# Patient Record
Sex: FEMALE | Race: ASIAN | Employment: STUDENT | URBAN - METROPOLITAN AREA
[De-identification: names, ages, dates, MRNs, and addresses within clinical notes are randomized per-mention and may not be internally consistent; named-entity substitution may affect disease eponyms.]

---
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
## 2019-01-03 ENCOUNTER — OFFICE VISIT (OUTPATIENT)
Dept: RHEUMATOLOGY | Facility: CLINIC | Age: 19
End: 2019-01-03
Attending: PEDIATRICS
Payer: COMMERCIAL

## 2019-01-03 VITALS
HEART RATE: 95 BPM | BODY MASS INDEX: 22.35 KG/M2 | HEIGHT: 62 IN | WEIGHT: 121.47 LBS | DIASTOLIC BLOOD PRESSURE: 66 MMHG | SYSTOLIC BLOOD PRESSURE: 108 MMHG | TEMPERATURE: 98.1 F

## 2019-01-03 DIAGNOSIS — L40.54 JIA (JUVENILE IDIOPATHIC ARTHRITIS), PSORIATIC SUBTYPE (H): ICD-10-CM

## 2019-01-03 DIAGNOSIS — Z79.1 NSAID LONG-TERM USE: ICD-10-CM

## 2019-01-03 DIAGNOSIS — Z79.631 METHOTREXATE, LONG TERM, CURRENT USE: ICD-10-CM

## 2019-01-03 DIAGNOSIS — L40.9 PSORIASIS: ICD-10-CM

## 2019-01-03 DIAGNOSIS — L40.54 JIA (JUVENILE IDIOPATHIC ARTHRITIS), PSORIATIC SUBTYPE (H): Primary | ICD-10-CM

## 2019-01-03 DIAGNOSIS — Z97.3 WEARS GLASSES: ICD-10-CM

## 2019-01-03 LAB
ALBUMIN SERPL-MCNC: 4 G/DL (ref 3.4–5)
ALBUMIN UR-MCNC: NEGATIVE MG/DL
ALP SERPL-CCNC: 46 U/L (ref 40–150)
ALT SERPL W P-5'-P-CCNC: 12 U/L (ref 0–50)
APPEARANCE UR: CLEAR
AST SERPL W P-5'-P-CCNC: 18 U/L (ref 0–35)
BACTERIA #/AREA URNS HPF: ABNORMAL /HPF
BASOPHILS # BLD AUTO: 0 10E9/L (ref 0–0.2)
BASOPHILS NFR BLD AUTO: 0.3 %
BILIRUB DIRECT SERPL-MCNC: 0.1 MG/DL (ref 0–0.2)
BILIRUB SERPL-MCNC: 0.4 MG/DL (ref 0.2–1.3)
BILIRUB UR QL STRIP: NEGATIVE
COLOR UR AUTO: YELLOW
DIFFERENTIAL METHOD BLD: NORMAL
EOSINOPHIL # BLD AUTO: 0.3 10E9/L (ref 0–0.7)
EOSINOPHIL NFR BLD AUTO: 4.7 %
ERYTHROCYTE [DISTWIDTH] IN BLOOD BY AUTOMATED COUNT: 12 % (ref 10–15)
GLUCOSE UR STRIP-MCNC: NEGATIVE MG/DL
HCT VFR BLD AUTO: 41.2 % (ref 35–47)
HGB BLD-MCNC: 13.5 G/DL (ref 11.7–15.7)
HGB UR QL STRIP: NEGATIVE
IMM GRANULOCYTES # BLD: 0 10E9/L (ref 0–0.4)
IMM GRANULOCYTES NFR BLD: 0 %
KETONES UR STRIP-MCNC: NEGATIVE MG/DL
LEUKOCYTE ESTERASE UR QL STRIP: NEGATIVE
LYMPHOCYTES # BLD AUTO: 2.4 10E9/L (ref 0.8–5.3)
LYMPHOCYTES NFR BLD AUTO: 38.5 %
MCH RBC QN AUTO: 28.3 PG (ref 26.5–33)
MCHC RBC AUTO-ENTMCNC: 32.8 G/DL (ref 31.5–36.5)
MCV RBC AUTO: 86 FL (ref 78–100)
MONOCYTES # BLD AUTO: 0.4 10E9/L (ref 0–1.3)
MONOCYTES NFR BLD AUTO: 6.6 %
MUCOUS THREADS #/AREA URNS LPF: PRESENT /LPF
NEUTROPHILS # BLD AUTO: 3.2 10E9/L (ref 1.6–8.3)
NEUTROPHILS NFR BLD AUTO: 49.9 %
NITRATE UR QL: NEGATIVE
NRBC # BLD AUTO: 0 10*3/UL
NRBC BLD AUTO-RTO: 0 /100
PH UR STRIP: 5.5 PH (ref 5–7)
PLATELET # BLD AUTO: 268 10E9/L (ref 150–450)
PROT SERPL-MCNC: 7.4 G/DL (ref 6.8–8.8)
RBC # BLD AUTO: 4.77 10E12/L (ref 3.8–5.2)
RBC #/AREA URNS AUTO: 1 /HPF (ref 0–2)
SOURCE: ABNORMAL
SP GR UR STRIP: 1.02 (ref 1–1.03)
SQUAMOUS #/AREA URNS AUTO: 2 /HPF (ref 0–1)
T4 FREE SERPL-MCNC: 0.97 NG/DL (ref 0.76–1.46)
TSH SERPL DL<=0.005 MIU/L-ACNC: 1.19 MU/L (ref 0.4–4)
UROBILINOGEN UR STRIP-MCNC: NORMAL MG/DL (ref 0–2)
WBC # BLD AUTO: 6.3 10E9/L (ref 4–11)
WBC #/AREA URNS AUTO: 3 /HPF (ref 0–5)

## 2019-01-03 PROCEDURE — 84439 ASSAY OF FREE THYROXINE: CPT | Performed by: PEDIATRICS

## 2019-01-03 PROCEDURE — 36415 COLL VENOUS BLD VENIPUNCTURE: CPT | Performed by: PEDIATRICS

## 2019-01-03 PROCEDURE — G0463 HOSPITAL OUTPT CLINIC VISIT: HCPCS

## 2019-01-03 PROCEDURE — 80076 HEPATIC FUNCTION PANEL: CPT | Performed by: PEDIATRICS

## 2019-01-03 PROCEDURE — 90686 IIV4 VACC NO PRSV 0.5 ML IM: CPT | Mod: ZF

## 2019-01-03 PROCEDURE — 25000128 H RX IP 250 OP 636: Mod: ZF

## 2019-01-03 PROCEDURE — 85025 COMPLETE CBC W/AUTO DIFF WBC: CPT | Performed by: PEDIATRICS

## 2019-01-03 PROCEDURE — G0008 ADMIN INFLUENZA VIRUS VAC: HCPCS | Mod: ZF

## 2019-01-03 PROCEDURE — 84443 ASSAY THYROID STIM HORMONE: CPT | Performed by: PEDIATRICS

## 2019-01-03 PROCEDURE — 81001 URINALYSIS AUTO W/SCOPE: CPT | Performed by: PEDIATRICS

## 2019-01-03 RX ORDER — MELOXICAM 7.5 MG/1
TABLET ORAL
Qty: 135 TABLET | Refills: 1 | Status: SHIPPED | OUTPATIENT
Start: 2019-01-03 | End: 2020-03-18

## 2019-01-03 ASSESSMENT — MIFFLIN-ST. JEOR: SCORE: 1280

## 2019-01-03 ASSESSMENT — PAIN SCALES - GENERAL: PAINLEVEL: NO PAIN (0)

## 2019-01-03 NOTE — LETTER
1/3/2019      RE: Deedee ESCOBEDO Suiter  [address]  [place] [address]              Problem list:     Patient Active Problem List    Diagnosis Date Noted     Wears glasses 01/06/2017     Methotrexate, long term, current use 12/28/2015     For ANAY.  Is NOT particularly immunosuppressant.         ANAY (juvenile idiopathic arthritis), psoriatic subtype 06/10/2015     First peds rheum consult 6/10/2015: right elbow arthritis, left knee arthritis, finger/toe stiffness, enthesitis, SI tenderness. Also had Strep (RST positive; treated, decreased ASO/AntiDNase B). Started on meloxicam. Improved exam on 7/30/2015.  Started SQ methotrexate 11/12/2015 due to ?SI arthritis on exam and finger arthritis.  MRI SI/Coccyx without contrast done at [facility] on 11/30/2015: Normal SI joints, mild edematous marrow signal changes of the sacrococcygeal joint and a mobile distal intercoccygeal joint suggestive of ongoing inflammation.  12/28/2015 ~ CROM. 12/19/2016 when not really on meloxicam, had some flare of inflammation.  3/30/2017 on more meloxicam, all normal but 2 fingers stiff       Psoriasis 04/26/2015     Guttate and scalp psoriasis.  Dx 2-3/2015.                   Medications:     As of completion of this visit:  Current Outpatient Medications   Medication Sig Dispense Refill     meloxicam (MOBIC) 7.5 MG tablet Take 1.5 tabs by mouth daily with food. 135 tablet 1     methotrexate 2.5 MG tablet CHEMO Take 6 tablets (15 mg) by mouth once a week 72 tablet 1     fluocinonide (LIDEX) 0.05 % ointment Apply topically 2 times daily Use twice daily to psoriasis areas on the arms, legs, body until spots are gone. (Patient not taking: Reported on 6/18/2018) 120 g 6     ondansetron (ZOFRAN-ODT) 4 MG ODT tab Take 1-2 tablets (4-8 mg) by mouth every 8 hours as needed for nausea Dissolve ON the tongue. (Patient not taking: Reported on 6/18/2018) 14 tablet 0             Subjective:     I saw Deedee in Pediatric Rheumatology Clinic on 01/03/2019 in  followup for psoriatic juvenile idiopathic arthritis.  Other diagnoses pertinent to today's visit include guttate and scalp psoriasis, generalized joint hypermobility and long-term use of meloxicam and methotrexate.  I last saw Deedee 6-1/2 months ago on 06/18/2018 when she had stiffness of her bilateral third and fourth fingers with on and off swelling and was taking her meloxicam about half the days of the week and her methotrexate fairly routinely.  I recommended tightening up her medications and rechecking.      Since last visit, Deedee tells me her arthritis was just fine until she forgot to take her medications home from school.  Thus, she has not taken her methotrexate since before 12/14/2018 (or about 3 weeks) and she has been off meloxicam or NSAID with the exception of 1 week post her wisdom teeth extraction when she took ibuprofen 600 mg 3-4 times per day.  She subsequently has been off that for about 1-1/2 weeks.  She can tell she has some stiffness and joint symptoms starting to pop through a little bit with this.      Her psoriasis is much better.  She has no spots on her scalp and she only has 1 very small spot on her left upper arm.  She is not using any topical therapies.      Prior to forgetting her medications at school, she was quite consistent with her meloxicam, taking it most of the days of the week, and much more consistent with methotrexate, taking it essentially every time it was due.      She has otherwise been well since I last saw her.  Her last eye exam was in 08/2018.  There were no concerns.      From a past medical history and surgical history standpoint, she had her wisdom teeth out on 12/17/2018.  She has had no complications to date.      From a social history standpoint, she continues at [employer] and she is in her sophomore year.  This is her hardest semester yet and she got 4 A's and 3 B's.  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  When she is  "home, she works at [employer].  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  This upcoming summer, she is hoping to work at a  clinic at least part of the time.      From a family history standpoint, there is no update as she was adopted.      Deedee has not yet gotten an influenza vaccination this season.     Comprehensive Review of Systems was performed and is negative except as noted in the HPI.    Information per our standardized questionnaire is as below:  Last Exam: 6/18/2018  (COIN) Last Eye Exam: 08/15/18  Last Radiograph : 10/26/17  Self Report  (COIN) Patient Pain Status: 2  (COIN) Patient Global Assessment Of Disease Activity: 0.5  Score Reported By: Self  (COIN) Patient Highest Level Of Education: some college or technical school  Arthritis History  (COIN) Morning stiffness in the past week: no stiffness  Has your arthritis stopped from trying any athletic or rigorous activities, or interfaced with your ability to do these activities: No  Have you been limited your ability to do normal daily activities in the past week: No  Did you needed help from other people to do normal activities in the past week: No  Have you used any aids or devices to help you do normal daily activities in the past week: No  Important Medical Events  (COIN) Patient has experienced drug-related serious adverse events since last encounter?: No           Examination:     Blood pressure 108/66, pulse 95, temperature 98.1  F (36.7  C), height 1.576 m (5' 2.05\"), weight 55.1 kg (121 lb 7.6 oz), not currently breastfeeding. Blood pressure percentiles are 37 % systolic and 54 % diastolic based on the August 2017 AAP Clinical Practice Guideline.    GEN:  Alert, awake and well-appearing.  HEENT:  Hair and scalp within normal limits.  Wears glasses.  Pupils equal and reactive to light.  Extraocular movements intact.  Conjunctiva clear.  External pinnae and tympanic membranes normal bilaterally. Nasal mucosa " normal without lesions.  Oral mucosa moist and without lesions. ?mild thyromegaly, non-tender, no nodules palpated.  LYMPH:  No cervical or supraclavicular lymphadenopathy.  CV:  Regular rate and rhythm.  No murmurs, rubs or gallops.  Radial and dorsalis pedal pulses full and symmetric.  RESP:  Clear to auscultation bilaterally with good aeration.   ABD:  Soft, non-tender, non-distended.  No hepatosplenomegaly or masses appreciated.  SKIN: A full skin exam is performed, except for the breast, genital and buttocks area, and is normal other than one small patch of scaly skin on left upper arm consistent with guttate psoriasis; dry skin.  Nails are normal.  Nail pits on left 3rd and right 4th nails.  NEURO:  Awake, alert and oriented.  Face symmetric.  MUSCULOSKELETAL: Joint exam including TMJ, cervical spine, acromioclavicular, sternoclavicular, shoulders, elbows, wrists, fingers, hips, knees, ankles, toes was performed and is normal except for pain with abduction of TMJ (1.5 s/p wisdom tooth extraction), stiffness of the right 3rd fingernail, inferior patellar pole tenderness to palpation on left, ???scant left knee effusion.  Left great toe MTP is tender to palpation on the plantar aspect.  Has baseline joint hypermobility of elbows, hips, knees, pes planus.   Back is flexible.  Strength is 5/5 in upper and lower extremities.   ANAY Exam Details:    Axial Skeleton  Temporomandibular: (ID low at 2.5 cm, 1.5 weeks after wisdom teeth extraction)  (COIN) Sacroiliac tenderness:: No  (COIN) Positive ESPERANZA test:: No  (COIN) Modified Schober's Test:: No  (COIN) Schober Test (Cm): (flexible, can palm floor)    Upper Extremity  Middle PIP: R Loss of Motion(Stiff flexion of right 3rd PIP)    Lower Extremity  Great MTP: L Tender    Entheses  Patella Insertions : R Tender  (COIN) Tender Entheses count: 1         Last Imaging Results:     10/26/2017 x-rays of the bilateral hands and knees: normal.          Last Lab Results:    Laboratory investigations performed today are listed below.    Office Visit on 01/03/2019   Component Date Value Ref Range Status     WBC 01/03/2019 6.3  4.0 - 11.0 10e9/L Final     RBC Count 01/03/2019 4.77  3.8 - 5.2 10e12/L Final     Hemoglobin 01/03/2019 13.5  11.7 - 15.7 g/dL Final     Hematocrit 01/03/2019 41.2  35.0 - 47.0 % Final     MCV 01/03/2019 86  78 - 100 fl Final     MCH 01/03/2019 28.3  26.5 - 33.0 pg Final     MCHC 01/03/2019 32.8  31.5 - 36.5 g/dL Final     RDW 01/03/2019 12.0  10.0 - 15.0 % Final     Platelet Count 01/03/2019 268  150 - 450 10e9/L Final     Diff Method 01/03/2019 Automated Method   Final     % Neutrophils 01/03/2019 49.9  % Final     % Lymphocytes 01/03/2019 38.5  % Final     % Monocytes 01/03/2019 6.6  % Final     % Eosinophils 01/03/2019 4.7  % Final     % Basophils 01/03/2019 0.3  % Final     % Immature Granulocytes 01/03/2019 0.0  % Final     Nucleated RBCs 01/03/2019 0  0 /100 Final     Absolute Neutrophil 01/03/2019 3.2  1.6 - 8.3 10e9/L Final     Absolute Lymphocytes 01/03/2019 2.4  0.8 - 5.3 10e9/L Final     Absolute Monocytes 01/03/2019 0.4  0.0 - 1.3 10e9/L Final     Absolute Eosinophils 01/03/2019 0.3  0.0 - 0.7 10e9/L Final     Absolute Basophils 01/03/2019 0.0  0.0 - 0.2 10e9/L Final     Abs Immature Granulocytes 01/03/2019 0.0  0 - 0.4 10e9/L Final     Absolute Nucleated RBC 01/03/2019 0.0   Final     Bilirubin Direct 01/03/2019 0.1  0.0 - 0.2 mg/dL Final     Bilirubin Total 01/03/2019 0.4  0.2 - 1.3 mg/dL Final     Albumin 01/03/2019 4.0  3.4 - 5.0 g/dL Final     Protein Total 01/03/2019 7.4  6.8 - 8.8 g/dL Final     Alkaline Phosphatase 01/03/2019 46  40 - 150 U/L Final     ALT 01/03/2019 12  0 - 50 U/L Final     AST 01/03/2019 18  0 - 35 U/L Final     TSH 01/03/2019 1.19  0.40 - 4.00 mU/L Final     T4 Free 01/03/2019 0.97  0.76 - 1.46 ng/dL Final     Color Urine 01/03/2019 Yellow   Final     Appearance Urine 01/03/2019 Clear   Final     Glucose Urine 01/03/2019  Negative  NEG^Negative mg/dL Final     Bilirubin Urine 01/03/2019 Negative  NEG^Negative Final     Ketones Urine 01/03/2019 Negative  NEG^Negative mg/dL Final     Specific Gravity Urine 01/03/2019 1.019  1.003 - 1.035 Final     Blood Urine 01/03/2019 Negative  NEG^Negative Final     pH Urine 01/03/2019 5.5  5.0 - 7.0 pH Final     Protein Albumin Urine 01/03/2019 Negative  NEG^Negative mg/dL Final     Urobilinogen mg/dL 01/03/2019 Normal  0.0 - 2.0 mg/dL Final     Nitrite Urine 01/03/2019 Negative  NEG^Negative Final     Leukocyte Esterase Urine 01/03/2019 Negative  NEG^Negative Final     Source 01/03/2019 Midstream Urine   Final     WBC Urine 01/03/2019 3  0 - 5 /HPF Final     RBC Urine 01/03/2019 1  0 - 2 /HPF Final     Bacteria Urine 01/03/2019 Few* NEG^Negative /HPF Final     Squamous Epithelial /HPF Urine 01/03/2019 2* 0 - 1 /HPF Final     Mucous Urine 01/03/2019 Present* NEG^Negative /LPF Final                Assessment:     Deedee is a 18-year 2-month-old female with:   1.  Psoriatic arthritis, interval history is reassuring and exam is essentially normal with the exception of some enthesitis and third finger stiffness despite essentially being off medications for the last 3 weeks as she forgot them at school.  She also has pain and decreased abduction of her TMJ 1-1/2 weeks status post wisdom teeth extraction.   2.  New mild anemia at last medication monitoring labs in September, repeat pending today.      At this point, I recommended that Deedee get back on her scheduled medications when she is able to but that she could use ibuprofen 3 times a day in the meantime until she is back to school in about a week and a half from now.              Plan:     1.  Laboratory studies today as above.  I did a TSH and free T4 due to her mild thyromegaly.   2.  No imaging today.   3.  Continue current medications once back at school (meloxicam and methotrexate).  Until back at school, should use ibuprofen 600 mg by mouth 3  times daily.   4.  Flu shot today.   5.  Next medication monitoring labs will be due in March, and she will be home in mid March for break.  I will put orders in Epic for these to be done and should include a CBC with differential, platelets and hepatic panel.   6.  Continue yearly eye exam screening for uveitis, next is due in approximately 08/2019.     7.  Followup with me in 6 months.  Call sooner with concerns.  At next visit, we will talk about planning for transition as next year will be her last year of college.       Thank you for continuing to involve me in Deedee's medical care.  Please do not hesitate to contact me with any questions or concerns.    Sincerely,    Hoda Dukes M.D.   of Pediatrics  Pediatric Rheumatology  Direct clinic number [phone]  Pager : [phone]    CC  Patient Care Team:  Addie Cochran MD as PCP - General (Pediatrics)  Lori Ewing MD as MD (Dermatology)    Copy to patient    Parent(s) of Deedee Suiter  [address] [facility] [address]

## 2019-01-03 NOTE — NURSING NOTE
"Chief Complaint   Patient presents with     RECHECK     ANAY     /66   Pulse 95   Temp 98.1  F (36.7  C)   Ht 5' 2.05\" (157.6 cm)   Wt 121 lb 7.6 oz (55.1 kg)   BMI 22.18 kg/m    Bianca Miranda CMA    "

## 2019-01-03 NOTE — PROGRESS NOTES
Problem list:     Patient Active Problem List    Diagnosis Date Noted     Wears glasses 01/06/2017     Methotrexate, long term, current use 12/28/2015     For ANAY.  Is NOT particularly immunosuppressant.         ANAY (juvenile idiopathic arthritis), psoriatic subtype 06/10/2015     First peds rheum consult 6/10/2015: right elbow arthritis, left knee arthritis, finger/toe stiffness, enthesitis, SI tenderness. Also had Strep (RST positive; treated, decreased ASO/AntiDNase B). Started on meloxicam. Improved exam on 7/30/2015.  Started SQ methotrexate 11/12/2015 due to ?SI arthritis on exam and finger arthritis.  MRI SI/Coccyx without contrast done at [facility] on 11/30/2015: Normal SI joints, mild edematous marrow signal changes of the sacrococcygeal joint and a mobile distal intercoccygeal joint suggestive of ongoing inflammation.  12/28/2015 ~ CROM. 12/19/2016 when not really on meloxicam, had some flare of inflammation.  3/30/2017 on more meloxicam, all normal but 2 fingers stiff       Psoriasis 04/26/2015     Guttate and scalp psoriasis.  Dx 2-3/2015.                   Medications:     As of completion of this visit:  Current Outpatient Medications   Medication Sig Dispense Refill     meloxicam (MOBIC) 7.5 MG tablet Take 1.5 tabs by mouth daily with food. 135 tablet 1     methotrexate 2.5 MG tablet CHEMO Take 6 tablets (15 mg) by mouth once a week 72 tablet 1     fluocinonide (LIDEX) 0.05 % ointment Apply topically 2 times daily Use twice daily to psoriasis areas on the arms, legs, body until spots are gone. (Patient not taking: Reported on 6/18/2018) 120 g 6     ondansetron (ZOFRAN-ODT) 4 MG ODT tab Take 1-2 tablets (4-8 mg) by mouth every 8 hours as needed for nausea Dissolve ON the tongue. (Patient not taking: Reported on 6/18/2018) 14 tablet 0             Subjective:     I saw Deedee in [facility]umatology Clinic on 01/03/2019 in followup for psoriatic juvenile idiopathic arthritis.  Other diagnoses pertinent  to today's visit include guttate and scalp psoriasis, generalized joint hypermobility and long-term use of meloxicam and methotrexate.  I last saw Deedee 6-1/2 months ago on 06/18/2018 when she had stiffness of her bilateral third and fourth fingers with on and off swelling and was taking her meloxicam about half the days of the week and her methotrexate fairly routinely.  I recommended tightening up her medications and rechecking.      Since last visit, Deedee tells me her arthritis was just fine until she forgot to take her medications home from school.  Thus, she has not taken her methotrexate since before 12/14/2018 (or about 3 weeks) and she has been off meloxicam or NSAID with the exception of 1 week post her wisdom teeth extraction when she took ibuprofen 600 mg 3-4 times per day.  She subsequently has been off that for about 1-1/2 weeks.  She can tell she has some stiffness and joint symptoms starting to pop through a little bit with this.      Her psoriasis is much better.  She has no spots on her scalp and she only has 1 very small spot on her left upper arm.  She is not using any topical therapies.      Prior to forgetting her medications at school, she was quite consistent with her meloxicam, taking it most of the days of the week, and much more consistent with methotrexate, taking it essentially every time it was due.      She has otherwise been well since I last saw her.  Her last eye exam was in 08/2018.  There were no concerns.      From a past medical history and surgical history standpoint, she had her wisdom teeth out on 12/17/2018.  She has had no complications to date.      From a social history standpoint, she continues at [employer] and she is in her sophomore year.  This is her hardest semester yet and she got 4 A's and 3 B's.  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  When she is home, she works at [employer].  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  This upcoming summer, she is hoping to work at a  clinic at least part of the time.      From a family history standpoint, there is no update as she was adopted.      Deedee has not yet gotten an influenza vaccination this season.     Comprehensive Review of Systems was performed and is negative except as noted in the HPI.    Information per our standardized questionnaire is as below:  Last Exam: 6/18/2018  (COIN) Last Eye Exam: 08/15/18  Last Radiograph : 10/26/17  Self Report  (COIN) Patient Pain Status: 2  (COIN) Patient Global Assessment Of Disease Activity: 0.5  Score Reported By: Self  (COIN) Patient Highest Level Of Education: some college or technical school  Arthritis History  (COIN) Morning stiffness in the past week: no stiffness  Has your arthritis stopped from trying any athletic or rigorous activities, or interfaced with your ability to do these activities: No  Have you been limited your ability to do normal daily activities in the past week: No  Did you needed help from other people to do normal activities in the past week: No  Have you used any aids or devices to help you do normal daily activities in the past week: No  Important Medical Events  (COIN) Patient has experienced drug-related serious adverse events since last encounter?: No           Examination:     Blood pressure 108/66, pulse 95, temperature 98.1  F (36.7  C), height 1.576 m (5' 2.05\"), weight 55.1 kg (121 lb 7.6 oz), not currently breastfeeding. Blood pressure percentiles are 37 % systolic and 54 % diastolic based on the August 2017 AAP Clinical Practice Guideline.    GEN:  Alert, awake and well-appearing.  HEENT:  Hair and scalp within normal limits.  Wears glasses.  Pupils equal and reactive to light.  Extraocular movements intact.  Conjunctiva clear.  External pinnae and tympanic membranes normal bilaterally. Nasal mucosa normal without lesions.  Oral mucosa moist and without lesions. ?mild thyromegaly, " non-tender, no nodules palpated.  LYMPH:  No cervical or supraclavicular lymphadenopathy.  CV:  Regular rate and rhythm.  No murmurs, rubs or gallops.  Radial and dorsalis pedal pulses full and symmetric.  RESP:  Clear to auscultation bilaterally with good aeration.   ABD:  Soft, non-tender, non-distended.  No hepatosplenomegaly or masses appreciated.  SKIN: A full skin exam is performed, except for the breast, genital and buttocks area, and is normal other than one small patch of scaly skin on left upper arm consistent with guttate psoriasis; dry skin.  Nails are normal.  Nail pits on left 3rd and right 4th nails.  NEURO:  Awake, alert and oriented.  Face symmetric.  MUSCULOSKELETAL: Joint exam including TMJ, cervical spine, acromioclavicular, sternoclavicular, shoulders, elbows, wrists, fingers, hips, knees, ankles, toes was performed and is normal except for pain with abduction of TMJ (1.5 s/p wisdom tooth extraction), stiffness of the right 3rd fingernail, inferior patellar pole tenderness to palpation on left, ???scant left knee effusion.  Left great toe MTP is tender to palpation on the plantar aspect.  Has baseline joint hypermobility of elbows, hips, knees, pes planus.   Back is flexible.  Strength is 5/5 in upper and lower extremities.   ANAY Exam Details:    Axial Skeleton  Temporomandibular: (ID low at 2.5 cm, 1.5 weeks after wisdom teeth extraction)  (COIN) Sacroiliac tenderness:: No  (COIN) Positive ESPERANZA test:: No  (COIN) Modified Schober's Test:: No  (COIN) Schober Test (Cm): (flexible, can palm floor)    Upper Extremity  Middle PIP: R Loss of Motion(Stiff flexion of right 3rd PIP)    Lower Extremity  Great MTP: L Tender    Entheses  Patella Insertions : R Tender  (COIN) Tender Entheses count: 1         Last Imaging Results:     10/26/2017 x-rays of the bilateral hands and knees: normal.          Last Lab Results:   Laboratory investigations performed today are listed below.    Office Visit on  01/03/2019   Component Date Value Ref Range Status     WBC 01/03/2019 6.3  4.0 - 11.0 10e9/L Final     RBC Count 01/03/2019 4.77  3.8 - 5.2 10e12/L Final     Hemoglobin 01/03/2019 13.5  11.7 - 15.7 g/dL Final     Hematocrit 01/03/2019 41.2  35.0 - 47.0 % Final     MCV 01/03/2019 86  78 - 100 fl Final     MCH 01/03/2019 28.3  26.5 - 33.0 pg Final     MCHC 01/03/2019 32.8  31.5 - 36.5 g/dL Final     RDW 01/03/2019 12.0  10.0 - 15.0 % Final     Platelet Count 01/03/2019 268  150 - 450 10e9/L Final     Diff Method 01/03/2019 Automated Method   Final     % Neutrophils 01/03/2019 49.9  % Final     % Lymphocytes 01/03/2019 38.5  % Final     % Monocytes 01/03/2019 6.6  % Final     % Eosinophils 01/03/2019 4.7  % Final     % Basophils 01/03/2019 0.3  % Final     % Immature Granulocytes 01/03/2019 0.0  % Final     Nucleated RBCs 01/03/2019 0  0 /100 Final     Absolute Neutrophil 01/03/2019 3.2  1.6 - 8.3 10e9/L Final     Absolute Lymphocytes 01/03/2019 2.4  0.8 - 5.3 10e9/L Final     Absolute Monocytes 01/03/2019 0.4  0.0 - 1.3 10e9/L Final     Absolute Eosinophils 01/03/2019 0.3  0.0 - 0.7 10e9/L Final     Absolute Basophils 01/03/2019 0.0  0.0 - 0.2 10e9/L Final     Abs Immature Granulocytes 01/03/2019 0.0  0 - 0.4 10e9/L Final     Absolute Nucleated RBC 01/03/2019 0.0   Final     Bilirubin Direct 01/03/2019 0.1  0.0 - 0.2 mg/dL Final     Bilirubin Total 01/03/2019 0.4  0.2 - 1.3 mg/dL Final     Albumin 01/03/2019 4.0  3.4 - 5.0 g/dL Final     Protein Total 01/03/2019 7.4  6.8 - 8.8 g/dL Final     Alkaline Phosphatase 01/03/2019 46  40 - 150 U/L Final     ALT 01/03/2019 12  0 - 50 U/L Final     AST 01/03/2019 18  0 - 35 U/L Final     TSH 01/03/2019 1.19  0.40 - 4.00 mU/L Final     T4 Free 01/03/2019 0.97  0.76 - 1.46 ng/dL Final     Color Urine 01/03/2019 Yellow   Final     Appearance Urine 01/03/2019 Clear   Final     Glucose Urine 01/03/2019 Negative  NEG^Negative mg/dL Final     Bilirubin Urine 01/03/2019 Negative   NEG^Negative Final     Ketones Urine 01/03/2019 Negative  NEG^Negative mg/dL Final     Specific Gravity Urine 01/03/2019 1.019  1.003 - 1.035 Final     Blood Urine 01/03/2019 Negative  NEG^Negative Final     pH Urine 01/03/2019 5.5  5.0 - 7.0 pH Final     Protein Albumin Urine 01/03/2019 Negative  NEG^Negative mg/dL Final     Urobilinogen mg/dL 01/03/2019 Normal  0.0 - 2.0 mg/dL Final     Nitrite Urine 01/03/2019 Negative  NEG^Negative Final     Leukocyte Esterase Urine 01/03/2019 Negative  NEG^Negative Final     Source 01/03/2019 Midstream Urine   Final     WBC Urine 01/03/2019 3  0 - 5 /HPF Final     RBC Urine 01/03/2019 1  0 - 2 /HPF Final     Bacteria Urine 01/03/2019 Few* NEG^Negative /HPF Final     Squamous Epithelial /HPF Urine 01/03/2019 2* 0 - 1 /HPF Final     Mucous Urine 01/03/2019 Present* NEG^Negative /LPF Final                Assessment:     Deedee is a 18-year 2-month-old female with:   1.  Psoriatic arthritis, interval history is reassuring and exam is essentially normal with the exception of some enthesitis and third finger stiffness despite essentially being off medications for the last 3 weeks as she forgot them at school.  She also has pain and decreased abduction of her TMJ 1-1/2 weeks status post wisdom teeth extraction.   2.  New mild anemia at last medication monitoring labs in September, repeat pending today.      At this point, I recommended that Deedee get back on her scheduled medications when she is able to but that she could use ibuprofen 3 times a day in the meantime until she is back to school in about a week and a half from now.              Plan:     1.  Laboratory studies today as above.  I did a TSH and free T4 due to her mild thyromegaly.   2.  No imaging today.   3.  Continue current medications once back at school (meloxicam and methotrexate).  Until back at school, should use ibuprofen 600 mg by mouth 3 times daily.   4.  Flu shot today.   5.  Next medication monitoring labs will be  due in March, and she will be home in mid March for break.  I will put orders in Epic for these to be done and should include a CBC with differential, platelets and hepatic panel.   6.  Continue yearly eye exam screening for uveitis, next is due in approximately 08/2019.     7.  Followup with me in 6 months.  Call sooner with concerns.  At next visit, we will talk about planning for transition as next year will be her last year of college.       Thank you for continuing to involve me in Deedee's medical care.  Please do not hesitate to contact me with any questions or concerns.    Sincerely,    Hoda Dukes M.D.   of Pediatrics  Pediatric Rheumatology  Direct clinic number [phone]  Pager : [phone]    CC  Patient Care Team:  Addie Noel MD as PCP - General (Pediatrics)  Addie Noel MD as PCP - Assigned PCP  Hoda Dukes MD as MD (Pediatrics)  Addie Noel MD as MD (Pediatrics)  Lori Ewing MD as MD (Dermatology)  Hoda Dukes MD as Referring Physician (Pediatrics)  Hoda Dukes MD as MD (Pediatrics)  ADDIE NOEL    Copy to patient  SUITER,CAITLYN   [id number] [facility] [address]

## 2019-01-03 NOTE — NURSING NOTE
"FLU VACCINE QUESTIONNAIRE:  Ask the following questions of all parties who want influenza vaccination:     CONTRAINDICATIONS  1.  Is the patient age less than 6 months?  NO  2.  Has the person to be vaccinated ever had Guillain-Midland syndrome? NO  3.  Has the person to be vaccinated had the vaccine this year? NO  4.  Is the person to be vaccinated sick today? NO  5.  Does the person to be vaccinated have an allergy to eggs or a component of the vaccine? NO  6.  Has the person to vaccinated ever had a serious reaction to an influenza vaccination in the past? NO    If the answer to ALL of the above questions is \"No\", then please administer the influenza vaccine per the standard protocol.  If the patient answered \"Yes\" to questions 1 or 2, do not administer the vaccine. If the patient answered \"Yes\" to question 3, do not administer the vaccine unless the patient is a child receiving the vaccine in two doses. If the patient answered \"Yes\" to questions 4, 5, and/or 6, get additional details on sickness and/or reaction and refer to provider. If you have any questions regarding contraindications, please refer to the provider.                                                         INFLUENZA VACCINATION NOTE      Information sheet given to patient and questions answered.     Patient or representative refused vaccination.   Reason:     ORDERS: Give influenza Vaccine   Ordered by Dr. Dukes on January 3, 2019    [ Do not give Influenza Vaccine due to contraindication or refusal ]    Candidate for Pneumovax? No    INDICATION FOR VACCINATION:  Anyone from 6 months of age or older.        Bharti Portillo M.A.      "

## 2019-01-03 NOTE — PATIENT INSTRUCTIONS
Little blips off meds--  Until back home can take/should take ibuprofen 600 mg three times daily.  Restart home meds once back.  Labs today.    Next labs March/April 2019.  Do at  clinic orders are in Epic.  Follow up with me in 6 months.  Eye exam in summer.  Flu shot today.    [facility]    For Help:  The Pediatric Call Center at [phone] can help with scheduling of routine follow up visits.  Brionna Santamaria and Grace Pierson are the Nurse Coordinators for the Division of Pediatric Rheumatology and can be reached directly at [phone]. They can help with questions about your child s rheumatic condition, medications, and test results.   Please try to schedule infusions 3 months in advance.  Please try to give us 72 hours or longer notice if you need to cancel infusions so other patients can benefit from this opening).  Note: Insurance authorization must be obtained before any infusion can be scheduled. If you change health insurance, you must notify our office as soon as possible, so that the infusion can be reauthorized.    For emergencies after hours or on the weekends, please call the page  at [phone] and ask to speak to the physician on-call for Pediatric Rheumatology. Please do not use [employer] for urgent requests.  Main  Services:  [phone]  o Hmong/Yoruba/Luxembourgish: [phone]  o Albanian: [phone]  o Divehi: [phone]

## 2019-07-25 ENCOUNTER — OFFICE VISIT (OUTPATIENT)
Dept: RHEUMATOLOGY | Facility: CLINIC | Age: 19
End: 2019-07-25
Attending: PEDIATRICS
Payer: COMMERCIAL

## 2019-07-25 VITALS
HEIGHT: 62 IN | WEIGHT: 126.76 LBS | HEART RATE: 84 BPM | DIASTOLIC BLOOD PRESSURE: 78 MMHG | SYSTOLIC BLOOD PRESSURE: 107 MMHG | TEMPERATURE: 98.6 F | BODY MASS INDEX: 23.33 KG/M2

## 2019-07-25 DIAGNOSIS — Z79.631 METHOTREXATE, LONG TERM, CURRENT USE: ICD-10-CM

## 2019-07-25 DIAGNOSIS — Z79.1 NSAID LONG-TERM USE: ICD-10-CM

## 2019-07-25 DIAGNOSIS — L40.54 JIA (JUVENILE IDIOPATHIC ARTHRITIS), PSORIATIC SUBTYPE (H): Primary | ICD-10-CM

## 2019-07-25 DIAGNOSIS — L40.9 PSORIASIS: ICD-10-CM

## 2019-07-25 LAB
ALBUMIN SERPL-MCNC: 4 G/DL (ref 3.4–5)
ALBUMIN UR-MCNC: NEGATIVE MG/DL
ALP SERPL-CCNC: 48 U/L (ref 40–150)
ALT SERPL W P-5'-P-CCNC: 31 U/L (ref 0–50)
APPEARANCE UR: CLEAR
AST SERPL W P-5'-P-CCNC: 19 U/L (ref 0–35)
BASOPHILS # BLD AUTO: 0 10E9/L (ref 0–0.2)
BASOPHILS NFR BLD AUTO: 0.5 %
BILIRUB DIRECT SERPL-MCNC: 0.1 MG/DL (ref 0–0.2)
BILIRUB SERPL-MCNC: 0.5 MG/DL (ref 0.2–1.3)
BILIRUB UR QL STRIP: NEGATIVE
COLOR UR AUTO: ABNORMAL
CREAT SERPL-MCNC: 0.61 MG/DL (ref 0.5–1)
CRP SERPL-MCNC: <2.9 MG/L (ref 0–8)
DIFFERENTIAL METHOD BLD: NORMAL
EOSINOPHIL # BLD AUTO: 0.1 10E9/L (ref 0–0.7)
EOSINOPHIL NFR BLD AUTO: 2.4 %
ERYTHROCYTE [DISTWIDTH] IN BLOOD BY AUTOMATED COUNT: 12.6 % (ref 10–15)
ERYTHROCYTE [SEDIMENTATION RATE] IN BLOOD BY WESTERGREN METHOD: 6 MM/H (ref 0–20)
GFR SERPL CREATININE-BSD FRML MDRD: >90 ML/MIN/{1.73_M2}
GLUCOSE UR STRIP-MCNC: NEGATIVE MG/DL
HCT VFR BLD AUTO: 41.7 % (ref 35–47)
HGB BLD-MCNC: 13.6 G/DL (ref 11.7–15.7)
HGB UR QL STRIP: NEGATIVE
IMM GRANULOCYTES # BLD: 0 10E9/L (ref 0–0.4)
IMM GRANULOCYTES NFR BLD: 0 %
KETONES UR STRIP-MCNC: NEGATIVE MG/DL
LEUKOCYTE ESTERASE UR QL STRIP: NEGATIVE
LYMPHOCYTES # BLD AUTO: 1.7 10E9/L (ref 0.8–5.3)
LYMPHOCYTES NFR BLD AUTO: 30.4 %
MCH RBC QN AUTO: 28.8 PG (ref 26.5–33)
MCHC RBC AUTO-ENTMCNC: 32.6 G/DL (ref 31.5–36.5)
MCV RBC AUTO: 88 FL (ref 78–100)
MONOCYTES # BLD AUTO: 0.4 10E9/L (ref 0–1.3)
MONOCYTES NFR BLD AUTO: 7 %
MUCOUS THREADS #/AREA URNS LPF: PRESENT /LPF
NEUTROPHILS # BLD AUTO: 3.3 10E9/L (ref 1.6–8.3)
NEUTROPHILS NFR BLD AUTO: 59.7 %
NITRATE UR QL: NEGATIVE
NRBC # BLD AUTO: 0 10*3/UL
NRBC BLD AUTO-RTO: 0 /100
PH UR STRIP: 5.5 PH (ref 5–7)
PLATELET # BLD AUTO: 232 10E9/L (ref 150–450)
PROT SERPL-MCNC: 7.4 G/DL (ref 6.8–8.8)
RBC # BLD AUTO: 4.73 10E12/L (ref 3.8–5.2)
RBC #/AREA URNS AUTO: <1 /HPF (ref 0–2)
SOURCE: ABNORMAL
SP GR UR STRIP: 1.01 (ref 1–1.03)
SQUAMOUS #/AREA URNS AUTO: 1 /HPF (ref 0–1)
UROBILINOGEN UR STRIP-MCNC: NORMAL MG/DL (ref 0–2)
WBC # BLD AUTO: 5.5 10E9/L (ref 4–11)
WBC #/AREA URNS AUTO: <1 /HPF (ref 0–5)

## 2019-07-25 PROCEDURE — 85025 COMPLETE CBC W/AUTO DIFF WBC: CPT | Performed by: PEDIATRICS

## 2019-07-25 PROCEDURE — 36415 COLL VENOUS BLD VENIPUNCTURE: CPT | Performed by: PEDIATRICS

## 2019-07-25 PROCEDURE — G0463 HOSPITAL OUTPT CLINIC VISIT: HCPCS | Mod: ZF

## 2019-07-25 PROCEDURE — 85652 RBC SED RATE AUTOMATED: CPT | Performed by: PEDIATRICS

## 2019-07-25 PROCEDURE — 86140 C-REACTIVE PROTEIN: CPT | Performed by: PEDIATRICS

## 2019-07-25 PROCEDURE — 80076 HEPATIC FUNCTION PANEL: CPT | Performed by: PEDIATRICS

## 2019-07-25 PROCEDURE — 82565 ASSAY OF CREATININE: CPT | Performed by: PEDIATRICS

## 2019-07-25 PROCEDURE — 81001 URINALYSIS AUTO W/SCOPE: CPT | Performed by: PEDIATRICS

## 2019-07-25 ASSESSMENT — MIFFLIN-ST. JEOR: SCORE: 1304

## 2019-07-25 ASSESSMENT — PAIN SCALES - GENERAL: PAINLEVEL: MILD PAIN (3)

## 2019-07-25 NOTE — NURSING NOTE
"Chief Complaint   Patient presents with     RECHECK     ANAY     /78 (BP Location: Left arm, Patient Position: Chair, Cuff Size: Adult Regular)   Pulse 84   Temp 98.6  F (37  C) (Oral)   Ht 5' 2.05\" (157.6 cm)   Wt 126 lb 12.2 oz (57.5 kg)   BMI 23.15 kg/m      Huong Devries LPN    "

## 2019-07-25 NOTE — PATIENT INSTRUCTIONS
STiff finger but not clear joint swelling--most suspicious to me  Knee tenderness of bottom of left knee cap. No AM stiffness--less suspcious of a lot of arthritis, could be enthesitis.    Up melxociam to 2 tabs daily.  Continue methotrexate.    Should improve things by 4-6 weeks from now, call if not.  Set up follow for late Sept/october on a Thursday AM.    Eye exam is due.August.    Hoda Dukes M.D.   of Pediatrics  Pediatric Rheumatology    [facility]    For Help:  The Pediatric Call Center at [phone] can help with scheduling of routine follow up visits.  Grace Pierson and Maya Silverman are the Nurse Coordinators for the Division of Pediatric Rheumatology and can be reached directly at [phone]. They can help with questions about your child s rheumatic condition, medications, and test results.  For emergencies after hours or on the weekends, please call the page  at [phone] and ask to speak to the physician on-call for Pediatric Rheumatology. Please do not use [employer] for urgent requests.  Main  Services:  [phone]  o Hmong/Abebe/Ecuadorean: [phone]  o Tristanian: [phone]  o English: [phone]

## 2019-07-27 NOTE — PROGRESS NOTES
Problem list:     Patient Active Problem List    Diagnosis Date Noted     NSAID long-term use 07/25/2019     Wears glasses 01/06/2017     Methotrexate, long term, current use 12/28/2015     For ANAY.  Is NOT particularly immunosuppressant.         ANAY (juvenile idiopathic arthritis), psoriatic subtype 06/10/2015     First peds rheum consult 6/10/2015: right elbow arthritis, left knee arthritis, finger/toe stiffness, enthesitis, SI tenderness. Also had Strep (RST positive; treated, decreased ASO/AntiDNase B). Started on meloxicam. Improved exam on 7/30/2015.  Started SQ methotrexate 11/12/2015 due to ?SI arthritis on exam and finger arthritis.  MRI SI/Coccyx without contrast done at [facility] on 11/30/2015: Normal SI joints, mild edematous marrow signal changes of the sacrococcygeal joint and a mobile distal intercoccygeal joint suggestive of ongoing inflammation.  12/28/2015 ~ [facility]. 12/19/2016 when not really on meloxicam, had some flare of inflammation.  3/30/2017 on more meloxicam, all normal but 2 fingers stiff       Psoriasis 04/26/2015     Guttate and scalp psoriasis.  Dx 2-3/2015.           History of methotrexate associated nausea, limiting dose.          Medications:     As of completion of this visit:  Current Outpatient Medications   Medication Sig Dispense Refill     meloxicam (MOBIC) 7.5 MG tablet Take 1.5 tabs by mouth daily with food. 135 tablet 1     methotrexate 2.5 MG tablet Take 6 tablets (15 mg) by mouth once a week 72 tablet 1             Subjective:     I saw Deedee in pediatric rheumatology clinic on 7/25/2019 in follow-up for psoriasis and psoriatic arthritis.  She also has generalized joint hypermobility.  She has a history of increased finger stiffness when she is inconsistent with her meloxicam.  I last saw Deedee on 1/3/2019, almost 7 months ago, when she had been off meds in the preceding approximate 3 weeks because she forgot them at school over winter break and she had had her wisdom teeth  removed.  I recommended she get back on her scheduled medications and let me know if they did not recapture her symptoms.    Initially, after she got back on her medications she was doing well until the past few months.  She tells me she had close to no joint symptoms.  However over the past few months she has had bilateral third finger, left fourth finger, and right fifth finger pain and stiffness.  She wonders if it is her job that causing the increase in stiffness.  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  However she reflects on the fact that she had the same job last summer and actually worked more hours at the metal cutting company and she does not remember symptoms like this last year.  She thinks maybe she has some swelling in her fingers, her tendons are kind of clicky.  She has morning stiffness in her fingers for about 15 minutes or less.  She also is getting some knee pain after sitting down holding the dogs at the back clinic.  It makes it hard to get up.  She has no issues getting out of the car, up from bed in the morning or up and down off the toilet.  She has almost no symptoms in her hips or knees in the morning.    From past medical history and surgical history standpoint her last eye exam was 8/15/2018.  She said no changes in her interval health since 1/3/2019.    From a family history standpoint she is adopted.    From a social history standpoint she will be a senior at [employer] this fall.  She is applying to the veterinary school.  She will know if and where she gets in by early spring.    From a review of systems she had a sore throat a week ago that felt like something was in the back of her throat made it hard to swallow.  She had a little bit of headache associated with it.  She was not seen.  No fevers.  No rash.  She now feels  "fine.    Comprehensive Review of Systems was performed and is negative except as noted in the HPI.    Information per our standardized questionnaire is as below:  Last Exam: 1/3/2019.  (COIN) Last Eye Exam: 08/15/18  Last Radiograph : 10/26/17  Self Report  (COIN) Patient Pain Status: 3  (COIN) Patient Global Assessment Of Disease Activity: 3  Score Reported By: Self  (COIN) Patient Highest Level Of Education: some college or technical school  Arthritis History  (COIN) Morning stiffness in the past week: no stiffness  Has your arthritis stopped from trying any athletic or rigorous activities, or interfaced with your ability to do these activities: No  Have you been limited your ability to do normal daily activities in the past week: Yes(getting up an dwriting)  Did you needed help from other people to do normal activities in the past week: No  Have you used any aids or devices to help you do normal daily activities in the past week: Yes(using table to get up)  Important Medical Events  (COIN) Patient has experienced drug-related serious adverse events since last encounter?: No         Examination:     Blood pressure 107/78, pulse 84, temperature 98.6  F (37  C), temperature source Oral, height 1.576 m (5' 2.05\"), weight 57.5 kg (126 lb 12.2 oz), not currently breastfeeding.  GEN:  Alert, awake and well-appearing.  HEENT:  Hair and scalp within normal limits.  Pupils equal and reactive to light.  Extraocular movements intact.  Conjunctiva clear.  External pinnae and tympanic membranes normal bilaterally. Nasal mucosa normal without lesions.  Oral mucosa moist and without lesions.  Surgical absence of tonsils.  LYMPH:  No cervical or supraclavicular lymphadenopathy.  CV:  Regular rate and rhythm.  No murmurs, rubs or gallops.  Radial and dorsalis pedal pulses full and symmetric.  RESP:  Clear to auscultation bilaterally with good aeration.   ABD:  Soft, non-tender, non-distended.  No hepatosplenomegaly or masses " appreciated.  SKIN: A full skin exam is performed, except for the breast, genital and buttocks area, and is normal.  Nails are notable for nail pits.  She has 1 psoriatic plaque that is small (about 1 cm in diameter) at her posterior left arm  NEURO:  Awake, alert and oriented.  Face symmetric.  MUSCULOSKELETAL: Joint exam including TMJ, cervical spine, acromioclavicular, sternoclavicular, shoulders, elbows, wrists, fingers, hips, knees, ankles, toes was performed and is normal except for notable normalization of her incisor distance after being further out from her wisdom teeth extraction, and stiffness with passive and active range of motion of her bilateral third fingers.  No clear tendon clicking, swelling, increased warmth.  Has tenderness to palpation of the left inferior patellar pole. Back is flexible.  Strength is 5/5 in upper and lower extremities. Gait and run are normal with known baseline out toeing, symmetric.  ANAY Exam Details:    Axial Skeleton  Temporomandibular: (ID increased to 5 cm)  (COIN) Sacroiliac tenderness:: No  (COIN) Positive ESPERANZA test:: No  (COIN) Modified Schober's Test:: No  (COIN) Schober Test (Cm): (flexible)    Upper Extremity  Middle PIP: (Stiff with passive flexion bilaterally)    Lower Extremity  Normal    Entheses  Patella Insertions : L Tender(tender at inferior left patellar pole)  (COIN) Tender Entheses count: 1         Last Imaging Results:     10/26/2017 x-rays of the bilateral hands and knees: normal.          Last Lab Results:   Laboratory investigations performed today are listed below.      Office Visit on 07/25/2019   Component Date Value Ref Range Status     Bilirubin Direct 07/25/2019 0.1  0.0 - 0.2 mg/dL Final     Bilirubin Total 07/25/2019 0.5  0.2 - 1.3 mg/dL Final     Albumin 07/25/2019 4.0  3.4 - 5.0 g/dL Final     Protein Total 07/25/2019 7.4  6.8 - 8.8 g/dL Final     Alkaline Phosphatase 07/25/2019 48  40 - 150 U/L Final     ALT 07/25/2019 31  0 - 50 U/L  Final     AST 07/25/2019 19  0 - 35 U/L Final     CRP Inflammation 07/25/2019 <2.9  0.0 - 8.0 mg/L Final     Creatinine 07/25/2019 0.61  0.50 - 1.00 mg/dL Final     GFR Estimate 07/25/2019 >90  >60 mL/min/[1.73_m2] Final     GFR Estimate If Black 07/25/2019 >90  >60 mL/min/[1.73_m2] Final     WBC 07/25/2019 5.5  4.0 - 11.0 10e9/L Final     RBC Count 07/25/2019 4.73  3.8 - 5.2 10e12/L Final     Hemoglobin 07/25/2019 13.6  11.7 - 15.7 g/dL Final     Hematocrit 07/25/2019 41.7  35.0 - 47.0 % Final     MCV 07/25/2019 88  78 - 100 fl Final     MCH 07/25/2019 28.8  26.5 - 33.0 pg Final     MCHC 07/25/2019 32.6  31.5 - 36.5 g/dL Final     RDW 07/25/2019 12.6  10.0 - 15.0 % Final     Platelet Count 07/25/2019 232  150 - 450 10e9/L Final     Diff Method 07/25/2019 Automated Method   Final     % Neutrophils 07/25/2019 59.7  % Final     % Lymphocytes 07/25/2019 30.4  % Final     % Monocytes 07/25/2019 7.0  % Final     % Eosinophils 07/25/2019 2.4  % Final     % Basophils 07/25/2019 0.5  % Final     % Immature Granulocytes 07/25/2019 0.0  % Final     Nucleated RBCs 07/25/2019 0  0 /100 Final     Absolute Neutrophil 07/25/2019 3.3  1.6 - 8.3 10e9/L Final     Absolute Lymphocytes 07/25/2019 1.7  0.8 - 5.3 10e9/L Final     Absolute Monocytes 07/25/2019 0.4  0.0 - 1.3 10e9/L Final     Absolute Eosinophils 07/25/2019 0.1  0.0 - 0.7 10e9/L Final     Absolute Basophils 07/25/2019 0.0  0.0 - 0.2 10e9/L Final     Abs Immature Granulocytes 07/25/2019 0.0  0 - 0.4 10e9/L Final     Absolute Nucleated RBC 07/25/2019 0.0   Final     Sed Rate 07/25/2019 6  0 - 20 mm/h Final     Color Urine 07/25/2019 Light Yellow   Final     Appearance Urine 07/25/2019 Clear   Final     Glucose Urine 07/25/2019 Negative  NEG^Negative mg/dL Final     Bilirubin Urine 07/25/2019 Negative  NEG^Negative Final     Ketones Urine 07/25/2019 Negative  NEG^Negative mg/dL Final     Specific Gravity Urine 07/25/2019 1.012  1.003 - 1.035 Final     Blood Urine 07/25/2019  Negative  NEG^Negative Final     pH Urine 07/25/2019 5.5  5.0 - 7.0 pH Final     Protein Albumin Urine 07/25/2019 Negative  NEG^Negative mg/dL Final     Urobilinogen mg/dL 07/25/2019 Normal  0.0 - 2.0 mg/dL Final     Nitrite Urine 07/25/2019 Negative  NEG^Negative Final     Leukocyte Esterase Urine 07/25/2019 Negative  NEG^Negative Final     Source 07/25/2019 Urine   Final     WBC Urine 07/25/2019 <1  0 - 5 /HPF Final     RBC Urine 07/25/2019 <1  0 - 2 /HPF Final     Squamous Epithelial /HPF Urine 07/25/2019 1  0 - 1 /HPF Final     Mucous Urine 07/25/2019 Present* NEG^Negative /LPF Final     These are normal.         Assessment:     Deedee is a 18-year-old female with  1. Psoriatic arthritis, interval history is notable for finger stiffness and some knee pain after prolonged sitting but not morning stiffness or pain in the knees.  She is more consistent with her medications this interval.  On exam she does have some bilateral third finger stiffness and mild tenderness to palpation of the inferior patellar pole.  By history I am suspicious that she has some inflammation in her fingers and that her knee symptoms are a mix of mechanical potentially some enthesitis.  2. Guttate psoriasis, currently under fairly good control.  This does however limit the possibility of using a steroid pulse for #1.  3. History of methotrexate associated nausea limiting her dose.  4. Recent sore throat and headache, 1 week ago.  Has no lymphadenopathy or erythema of her posterior oropharynx.  Thus I am not going to do any strep screen today.  Additionally her joint symptoms long predate this illness.    We discussed increasing her meloxicam to 2 tablets (15 mg) daily and continue her methotrexate.  She will let me know if her symptoms do not improve by 4 to 6 weeks from now and certainly if they are worsening in the meantime.         Plan:     1. Labs today, as above.  Doing no she needs to get labs done to monitor for medication side  effects every 3 months.  I will be seeing her next in 3 months and will do that at that time.  She will then have to figure out a plan for the upcoming lab studies.  Will readdress this at her follow-up.  2. No imaging today.    3. Continue methotrexate and folic acid.    4. Increase meloxicam from 11.25 mg to 15 mg by mouth daily.    5. Call or MyChart if symptoms are not improving in 4 to 6 weeks from now, certainly sooner if things are worsening.    6. Set up a follow-up eye exam for the yearly screening for uveitis.  It is due this summer.    7. Follow-up with me in late September or October 2019.  Deedee and I will start discussing transition, and move forward with that when she knows where she will be next year.  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     Thank you for continuing to involve me in Deedee's medical care.  Please do not hesitate to contact me with any questions or concerns.    Sincerely,    Hoda Dukes M.D.   of Pediatrics  Pediatric Rheumatology  Direct clinic number [phone]  Pager : [phone]      CC  Patient Care Team:  Addie Noel MD as PCP - General (Pediatrics)  Addie Noel MD as Assigned PCP  Hoda Dukes MD as MD (Pediatrics)  Addie Noel MD as MD (Pediatrics)  Lori Ewing MD as MD (Dermatology)  Hoda Dukes MD as Referring Physician (Pediatrics)  Hoda Dukes MD as MD (Pediatrics)  ADDIE NOEL    Copy to patient  SUITER,CAITLYN   [address] [facility] [id number]

## 2019-09-20 ENCOUNTER — OFFICE VISIT (OUTPATIENT)
Dept: URGENT CARE | Facility: URGENT CARE | Age: 19
End: 2019-09-20
Payer: COMMERCIAL

## 2019-09-20 VITALS
OXYGEN SATURATION: 100 % | TEMPERATURE: 98.4 F | DIASTOLIC BLOOD PRESSURE: 79 MMHG | WEIGHT: 121.6 LBS | SYSTOLIC BLOOD PRESSURE: 116 MMHG | HEART RATE: 82 BPM | BODY MASS INDEX: 22.38 KG/M2 | HEIGHT: 62 IN

## 2019-09-20 DIAGNOSIS — R21 RASH AND NONSPECIFIC SKIN ERUPTION: Primary | ICD-10-CM

## 2019-09-20 PROCEDURE — 99213 OFFICE O/P EST LOW 20 MIN: CPT | Performed by: FAMILY MEDICINE

## 2019-09-20 RX ORDER — TRIAMCINOLONE ACETONIDE 1 MG/G
CREAM TOPICAL 2 TIMES DAILY
Qty: 30 G | Refills: 0 | Status: SHIPPED | OUTPATIENT
Start: 2019-09-20 | End: 2019-09-27

## 2019-09-20 ASSESSMENT — MIFFLIN-ST. JEOR: SCORE: 1279.82

## 2019-09-21 NOTE — PROGRESS NOTES
Subjective     Deedee Tyson is a 18 year old female who presents to clinic today for the following health issues:    HPI   Chief Complaint   Patient presents with     Blister     blister x 3 days        Duration: 3 days     Description (location/character/radiation): right lower leg skin lesion, itching, clear discharge,     Intensity:  moderate    Accompanying signs and symptoms: no fever, chills     History (similar episodes/previous evaluation): denies any insect bite, exposure to poison ivy or wearing any new jewelry     Precipitating or alleviating factors: None    Therapies tried and outcome: triple antibiotic          Patient Active Problem List   Diagnosis     Psoriasis     ANAY (juvenile idiopathic arthritis), psoriatic subtype     Methotrexate, long term, current use     Wears glasses     NSAID long-term use     Past Surgical History:   Procedure Laterality Date     TONSILLECTOMY N/A 12/26/2017    Procedure: TONSILLECTOMY;  TONSILLECTOMY;  Surgeon: Alexis Mckinnon MD;  Location:  OR       Social History     Tobacco Use     Smoking status: Never Smoker     Smokeless tobacco: Never Used     Tobacco comment: no smokers in the house   Substance Use Topics     Alcohol use: No     Alcohol/week: 0.0 oz     Family History   Adopted: Yes   Problem Relation Age of Onset     Unknown/Adopted Mother      Unknown/Adopted Father          Current Outpatient Medications   Medication Sig Dispense Refill     meloxicam (MOBIC) 7.5 MG tablet Take 1.5 tabs by mouth daily with food. 135 tablet 1     methotrexate 2.5 MG tablet Take 6 tablets (15 mg) by mouth once a week 72 tablet 1     No Known Allergies  Recent Labs   Lab Test 07/25/19  1054 01/03/19  1022 06/18/18  0954  06/10/15  1157   ALT 31 12 15   < > 14   CR 0.61  --  0.68   < > 0.68   GFRESTIMATED >90  --  >90   < > GFR not calculated, patient <16 years old.  Non  GFR Calc     GFRESTBLACK >90  --  >90   < > GFR not calculated, patient <16 years  "old.   GFR Calc     TSH  --  1.19  --   --  1.47    < > = values in this interval not displayed.      BP Readings from Last 3 Encounters:   09/20/19 116/79   07/25/19 107/78   01/03/19 108/66    Wt Readings from Last 3 Encounters:   09/20/19 55.2 kg (121 lb 9.6 oz) (37 %)*   07/25/19 57.5 kg (126 lb 12.2 oz) (48 %)*   01/03/19 55.1 kg (121 lb 7.6 oz) (39 %)*     * Growth percentiles are based on CDC (Girls, 2-20 Years) data.                      Reviewed and updated as needed this visit by Provider         Review of Systems   ROS COMP: Constitutional, HEENT, cardiovascular, pulmonary, gi and gu systems are negative, except as otherwise noted.      Objective    /79 (BP Location: Right arm, Patient Position: Sitting, Cuff Size: Adult Small)   Pulse 82   Temp 98.4  F (36.9  C) (Oral)   Ht 1.575 m (5' 2\")   Wt 55.2 kg (121 lb 9.6 oz)   LMP 09/08/2019   SpO2 100%   BMI 22.24 kg/m    Body mass index is 22.24 kg/m .  Physical Exam   GENERAL: healthy, alert and no distress  NECK: no adenopathy, no asymmetry, masses, or scars and thyroid normal to palpation  MS: no gross musculoskeletal defects noted, no edema  SKIN: slightly erythematous skin lesion just above right ankle with some yellowish crust, skin dryness as shown below  NEURO: Normal strength and tone, mentation intact and speech normal                Assessment & Plan     (R21) Rash and nonspecific skin eruption  (primary encounter diagnosis)  Comment: Differentials discussed in detail including nut not limited to dermatitis/eczema, insect bite, impetigo.  Patient already using triple antibiotic without any benefit and benefit.  Kenalog prescribed, common side effects discussed.  Suggested to keep the area clean with soap and water.  Return criteria discussed in detail.  Patient understood and in agreement with the above plan.  All questions answered.  Plan: triamcinolone (KENALOG) 0.1 % external cream            Garrett Wan, " MD  [facility]

## 2019-11-08 ENCOUNTER — HEALTH MAINTENANCE LETTER (OUTPATIENT)
Age: 19
End: 2019-11-08

## 2020-03-18 ENCOUNTER — VIRTUAL VISIT (OUTPATIENT)
Dept: RHEUMATOLOGY | Facility: CLINIC | Age: 20
End: 2020-03-18
Attending: PEDIATRICS
Payer: COMMERCIAL

## 2020-03-18 VITALS — HEIGHT: 62 IN | BODY MASS INDEX: 22.08 KG/M2 | WEIGHT: 120 LBS

## 2020-03-18 DIAGNOSIS — Z79.631 METHOTREXATE, LONG TERM, CURRENT USE: ICD-10-CM

## 2020-03-18 DIAGNOSIS — L40.54 JIA (JUVENILE IDIOPATHIC ARTHRITIS), PSORIATIC SUBTYPE (H): ICD-10-CM

## 2020-03-18 DIAGNOSIS — L40.54 JIA (JUVENILE IDIOPATHIC ARTHRITIS), PSORIATIC SUBTYPE (H): Primary | ICD-10-CM

## 2020-03-18 DIAGNOSIS — Z97.3 WEARS GLASSES: ICD-10-CM

## 2020-03-18 DIAGNOSIS — L40.9 PSORIASIS: ICD-10-CM

## 2020-03-18 DIAGNOSIS — Z79.1 NSAID LONG-TERM USE: ICD-10-CM

## 2020-03-18 RX ORDER — MELOXICAM 7.5 MG/1
TABLET ORAL
Qty: 135 TABLET | Refills: 1 | Status: SHIPPED | OUTPATIENT
Start: 2020-03-18

## 2020-03-18 ASSESSMENT — MIFFLIN-ST. JEOR: SCORE: 1267.57

## 2020-03-18 NOTE — PROGRESS NOTES
"Deedee Tyson is a 19 year old female who is being evaluated via a billable telephone visit.      The patient has been notified of following:     \"This telephone visit will be conducted via a call between you and your physician/provider. We have found that certain health care needs can be provided without the need for a physical exam.  This service lets us provide the care you need with a short phone conversation.  If a prescription is necessary we can send it directly to your pharmacy.  If lab work is needed we can place an order for that and you can then stop by our lab to have the test done at a later time.    If during the course of the call the physician/provider feels a telephone visit is not appropriate, you will not be charged for this service.\"       Deedee Tyson complains of    Chief Complaint   Patient presents with     RECHECK     ANAY. 'no concerns'       I have reviewed and updated the patient's Past Medical History, Social History, Family History and Medication List.    ALLERGIES  Patient has no known allergies.    Kayley Ag, EMT           Problem list:     Patient Active Problem List    Diagnosis Date Noted     NSAID long-term use 07/25/2019     Wears glasses 01/06/2017     Methotrexate, long term, current use 12/28/2015     For ANAY.  Is NOT particularly immunosuppressant.         ANAY (juvenile idiopathic arthritis), psoriatic subtype 06/10/2015     First peds rheum consult 6/10/2015: right elbow arthritis, left knee arthritis, finger/toe stiffness, enthesitis, SI tenderness. Also had Strep (RST positive; treated, decreased ASO/AntiDNase B). Started on meloxicam. Improved exam on 7/30/2015.  Started SQ methotrexate 11/12/2015 due to ?SI arthritis on exam and finger arthritis.  MRI SI/Coccyx without contrast done at [facility] on 11/30/2015: Normal SI joints, mild edematous marrow signal changes of the sacrococcygeal joint and a mobile distal intercoccygeal joint suggestive of ongoing inflammation.  " 12/28/2015 ~ [facility]. 12/19/2016 when not really on meloxicam, had some flare of inflammation.  3/30/2017 on more meloxicam, all normal but 2 fingers stiff       Psoriasis 04/26/2015     Guttate and scalp psoriasis.  Dx 2-3/2015.                   Medications:     As of completion of this visit:  Current Outpatient Medications   Medication Sig Dispense Refill     meloxicam (MOBIC) 7.5 MG tablet Take 1.5 tabs by mouth daily with food. 135 tablet 1     methotrexate 2.5 MG tablet Take 6 tablets (15 mg) by mouth once a week 72 tablet 1             Subjective:     I spoke with Deedee today by telephone in follow-up for her to take psoriasis and psoriatic arthritis.  Other diagnoses pertinent to today's visit included generalized joint hypermobility.  I last saw Deedee almost 8 months ago on 7/25/2019.  She had bilateral fingers stiffness and pain and morning stiffness less than 15 minutes.  She wondered if it was related to her repetitive hand activities at her job.  She did have some stiffness on exam of her bilateral third fingers and we increased her meloxicam to 15 mg daily.    Deedee's goals today are to discuss whether or not she should continue on the same medications, when she will transition to adult rheumatology, and to check in.    Deedee tells me she is doing well from a musculoskeletal standpoint.  She is not having any finger stiffness or joint symptoms.  From her psoriasis standpoint she feels okay with where she is at.  She has landed on meloxicam 11.25 mg daily, mostly because she started running out of medications.  She takes her methotrexate weekly without issue.    Deedee had a particularly tough spring semester as it was her senior year year and she had to go to distance learning due to COVID-19 changes at her school.  She had to deal with a lot of stress and mental stuff and has recovered from that.  During that time she is not as consistent with her medications.  She did not have any clear regular joint symptoms  however even when she was less consistent with her medications.    From a past medical history and surgical history standpoint she had an eye exam in August 2019.  Visit note is not available for review.  She tells me there were no concerns.  She has had no other changes to health since last visit.    From a family history standpoint she is adopted.    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  She is graduated from [employer].  She is taking a year off which she plans to spend locally and work, whenever she can get a job.  She plans to pursue a masters in chemistry, likely in [employer].    Comprehensive 14 point review of systems was negative except as noted above in the HPI.    Comprehensive Review of Systems was performed and is negative except as noted in the HPI.         Last Imaging Results:     10/26/2017 x-rays of the bilateral hands and knees: normal.              Last Lab Results:   Last labs were done on 7/25/2019.  She has not had any interval labs.             Assessment:     Deedee is a 19-year-old female with:  1. Psoriatic arthritis, interval history is reassuring on meloxicam and methotrexate.  2. Guttate psoriasis  3. History of methotrexate associated nausea limiting dose.  4. Continued inconsistency about medication monitoring labs.    Deedee and I discussed her continuing her current medication regimen, specifically the meloxicam dose she is currently on of 11.25 mg daily and the weekly methotrexate.  We specifically discussed the need to get every 3 to 4-month laboratory monitoring studies while on methotrexate with additional studies every 6 months for her meloxicam.  She will try to get these done locally at a lab, pending lab appointments given the current climate with coronavirus.    Deedee is inclined to stay locally for the next year and then, when she goes to get her masters, move essentially permanently to wherever she goes, likely [place].  Thus I will  continue to follow her for the next year and then we will transition her.  We will rediscuss this again this summer at her follow-up.           Plan:     1. Labs are overdue.  Orders are placed in epic for her to do at a [facility] locally.  This is to include a CBC with differential and platelets, hepatic panel, creatinine and urinalysis.  2. Next medication monitoring labs will be due in 3 to 4 months and should include a CBC with differential and platelets and hepatic panel.  3. Continue current medications and refills are provided.  4. Continue yearly eye exam screening for uveitis next is due in August 2020.  5. Follow-up with me in 3 to 4 months, call or MyChart sooner with concerns.    Thank you for continuing to involve me in Deedee's medical care.  Please do not hesitate to contact me with any questions or concerns.    Phone call contact time  Call Started at 3:15pm  Call Ended at 3:26 pm    Sincerely,    Hoda Dukes M.D.   of Pediatrics  Pediatric Rheumatology  Direct clinic number [phone]  Pager : [phone]    CC  Patient Care Team:  Addie Noel MD as PCP - General (Pediatrics)  dAdie Noel MD as Assigned PCP  Hoda Dukes MD as MD (Pediatrics)  Addie Noel MD as MD (Pediatrics)  Lori Ewing MD as MD (Dermatology)  Hoda Dukes MD as Referring Physician (Pediatrics)  Hoda Dukes MD as MD (Pediatrics)  ADDIE NOEL    Copy to patient  SUITER,CAITLYN   [id number] [facility] [id number]

## 2020-03-19 DIAGNOSIS — Z79.631 METHOTREXATE, LONG TERM, CURRENT USE: ICD-10-CM

## 2020-03-19 DIAGNOSIS — Z79.1 NSAID LONG-TERM USE: ICD-10-CM

## 2020-03-19 DIAGNOSIS — L40.54 JIA (JUVENILE IDIOPATHIC ARTHRITIS), PSORIATIC SUBTYPE (H): ICD-10-CM

## 2020-03-19 LAB
ALBUMIN SERPL-MCNC: 4.1 G/DL (ref 3.4–5)
ALBUMIN UR-MCNC: NEGATIVE MG/DL
ALP SERPL-CCNC: 50 U/L (ref 40–150)
ALT SERPL W P-5'-P-CCNC: 22 U/L (ref 0–50)
APPEARANCE UR: CLEAR
AST SERPL W P-5'-P-CCNC: 19 U/L (ref 0–45)
BASOPHILS # BLD AUTO: 0 10E9/L (ref 0–0.2)
BASOPHILS NFR BLD AUTO: 0.5 %
BILIRUB DIRECT SERPL-MCNC: 0.1 MG/DL (ref 0–0.2)
BILIRUB SERPL-MCNC: 0.4 MG/DL (ref 0.2–1.3)
BILIRUB UR QL STRIP: NEGATIVE
COLOR UR AUTO: YELLOW
CREAT SERPL-MCNC: 0.72 MG/DL (ref 0.52–1.04)
DIFFERENTIAL METHOD BLD: NORMAL
EOSINOPHIL # BLD AUTO: 0.2 10E9/L (ref 0–0.7)
EOSINOPHIL NFR BLD AUTO: 2.3 %
ERYTHROCYTE [DISTWIDTH] IN BLOOD BY AUTOMATED COUNT: 12.8 % (ref 10–15)
GFR SERPL CREATININE-BSD FRML MDRD: >90 ML/MIN/{1.73_M2}
GLUCOSE UR STRIP-MCNC: NEGATIVE MG/DL
HCT VFR BLD AUTO: 41.4 % (ref 35–47)
HGB BLD-MCNC: 13.4 G/DL (ref 11.7–15.7)
HGB UR QL STRIP: NEGATIVE
KETONES UR STRIP-MCNC: NEGATIVE MG/DL
LEUKOCYTE ESTERASE UR QL STRIP: NEGATIVE
LYMPHOCYTES # BLD AUTO: 2.5 10E9/L (ref 0.8–5.3)
LYMPHOCYTES NFR BLD AUTO: 34.5 %
MCH RBC QN AUTO: 28.3 PG (ref 26.5–33)
MCHC RBC AUTO-ENTMCNC: 32.4 G/DL (ref 31.5–36.5)
MCV RBC AUTO: 87 FL (ref 78–100)
MONOCYTES # BLD AUTO: 0.7 10E9/L (ref 0–1.3)
MONOCYTES NFR BLD AUTO: 9.3 %
NEUTROPHILS # BLD AUTO: 3.9 10E9/L (ref 1.6–8.3)
NEUTROPHILS NFR BLD AUTO: 53.4 %
NITRATE UR QL: NEGATIVE
PH UR STRIP: 6 PH (ref 5–7)
PLATELET # BLD AUTO: 271 10E9/L (ref 150–450)
PROT SERPL-MCNC: 7.7 G/DL (ref 6.8–8.8)
RBC # BLD AUTO: 4.74 10E12/L (ref 3.8–5.2)
SOURCE: NORMAL
SP GR UR STRIP: 1.02 (ref 1–1.03)
UROBILINOGEN UR STRIP-ACNC: 0.2 EU/DL (ref 0.2–1)
WBC # BLD AUTO: 7.3 10E9/L (ref 4–11)

## 2020-03-19 PROCEDURE — 81003 URINALYSIS AUTO W/O SCOPE: CPT | Performed by: PEDIATRICS

## 2020-03-19 PROCEDURE — 36415 COLL VENOUS BLD VENIPUNCTURE: CPT | Performed by: PEDIATRICS

## 2020-03-19 PROCEDURE — 82565 ASSAY OF CREATININE: CPT | Performed by: PEDIATRICS

## 2020-03-19 PROCEDURE — 85025 COMPLETE CBC W/AUTO DIFF WBC: CPT | Performed by: PEDIATRICS

## 2020-03-19 PROCEDURE — 80076 HEPATIC FUNCTION PANEL: CPT | Performed by: PEDIATRICS

## 2020-11-11 ENCOUNTER — VIRTUAL VISIT (OUTPATIENT)
Dept: FAMILY MEDICINE | Facility: OTHER | Age: 20
End: 2020-11-11

## 2020-11-11 DIAGNOSIS — Z20.822 SUSPECTED COVID-19 VIRUS INFECTION: Primary | ICD-10-CM

## 2020-11-11 NOTE — PROGRESS NOTES
"Date: 2020 13:25:22  Clinician: Veronica Avendaño  Clinician NPI: [id number]  Patient: Deedee Suiter  Patient : 2000  Patient Address: [address] [place] [address], [place] [address]  Patient Phone: [phone]  Visit Protocol: URI  Patient Summary:  Deedee is a 20 year old ( : 2000 ) female who initiated a [facility] Visit for COVID-19 (Coronavirus) evaluation and screening. When asked the question \"Please sign me up to receive news, health information and promotions. \", Deedee responded \"No\".    When asked when her symptoms started, Deedee reported that she does not have any symptoms.   She denies taking antibiotic medication in the past month and having recent facial or sinus surgery in the past 60 days.    Pertinent COVID-19 (Coronavirus) information  Deedee does not work or volunteer as healthcare worker or a . In the past 14 days, Deedee has not worked or volunteered at a healthcare facility or group living setting.   In the past 14 days, she also has not lived in a congregate living setting.   Deedee has had a close contact with a laboratory-confirmed COVID-19 patient in the last 14 days. She was not exposed at her work. Date Deedee was exposed to the laboratory-confirmed COVID-19 patient: 2020   Additional information about contact with COVID-19 (Coronavirus) patient as reported by the patient (free text): A person at a baby shower I attended on 20 tested positive for Covid on 11/10/20. It was a rapid test that is conducted regularly for this person because this person works at a long term home facility.   Deedee is not living in the same household with the COVID-19 positive patient. She was in an enclosed space for greater than 15 minutes with the COVID-19 patient.   During the encounter, neither were wearing masks.   Since 2019, Deedee has not been tested for COVID-19 and has not had upper respiratory infection or influenza-like illness.   Pertinent medical history  Deedee does not get yeast " infections when she takes antibiotics.   Deedee needs a return to work/school note.   Weight: 126 lbs   Deedee does not smoke or use smokeless tobacco.   She denies pregnancy and denies breastfeeding. Her last period was over a month ago.   Weight: 126 lbs    MEDICATIONS: methotrexate sodium oral, meloxicam oral, ALLERGIES: NKDA  Clinician Response:  Dear Deedee,   Based on your exposure to COVID-19 (coronavirus), we would like to test you for this virus.  1. Please call [phone] to schedule your visit. Explain that you were referred by [facility] to have a COVID-19 test. Be ready to [facility].  * If you need to schedule in [facility] please call [phone] or for [employer] employees please call [phone].   * If you need to schedule in the [place] area please call [phone]. [employer] employees call [phone].   The following will serve as your written order for this COVID Test, ordered by me, for the indication of suspected COVID [Z20.828]: The test will be ordered in [employer], our electronic health record, after you are scheduled. It will show as ordered and authorized by Sher Portillo MD.  Order: COVID-19 (coronavirus) PCR for ASYMPTOMATIC EXPOSURE testing from [facility].   If you know you have had close contact with someone who tested positive, you should be quarantined for 14 days after this exposure. You should stay in quarantine for the14 days even if the covid test is negative, the optimal time to test after exposure is 5-7 days from the exposure  Quarantine means   What should I do?  For safety, it's very important to follow these rules. Do this for 14 days after the date you were last exposed to the virus..  Stay home and away from others. Don't go to school or anywhere else. Generally quarantine means staying home from work but there are some exceptions to this. Please contact your workplace.   No hugging, kissing or shaking hands.  Don't let anyone visit.  Cover your mouth and nose with  a mask, tissue or washcloth to avoid spreading germs.  Wash your hands and face often. Use soap and water.  What are the symptoms of COVID-19?  The most common symptoms are cough, fever and trouble breathing. Less common symptoms include headache, body aches, fatigue (feeling very tired), chills, sore throat, stuffy or runny nose, diarrhea (loose poop), loss of taste or smell, belly pain, and nausea or vomiting (feeling sick to your stomach or throwing up).  After 14 days, if you have still don't have symptoms, you likely don't have this virus.  If you develop symptoms, follow these guidelines.  If you're normally healthy: Please start another [facility] visit to report your symptoms. Go to [email or web address].  If you have a serious health problem (like cancer, heart failure, an organ transplant or kidney disease): Call your specialty clinic. Let them know that you might have COVID-19.  2. When it's time for your COVID test:  Stay at least 6 feet away from others. (If someone will drive you to your test, stay in the backseat, as far away from the  as you can.)  Cover your mouth and nose with a mask, tissue or washcloth.  Go straight to the testing site. Don't make any stops on the way there or back.  Please note  Caregivers in these groups are at risk for severe illness due to COVID-19:  o People 65 years and older  o People who live in a nursing home or long-term care facility  o People with chronic disease (lung, heart, cancer, diabetes, kidney, liver, immunologic)  o People who have a weakened immune system, including those who:  Are in cancer treatment  Take medicine that weakens the immune system, such as corticosteroids  Had a bone marrow or organ transplant  Have an immune deficiency  Have poorly controlled HIV or AIDS  Are obese (body mass index of 40 or higher)  Smoke regularly  Where can I get more information?   [facility] -- About COVID-19: [email or web address]  CDC -- What to Do If You're  Sick: www.cdc.gov/coronavirus/2019-ncov/about/steps-when-sick.html  CDC -- Ending Home Isolation: www.cdc.gov/coronavirus/2019-ncov/hcp/disposition-in-home-patients.html  [facility] -- Caring for Someone: www.cdc.gov/coronavirus/2019-ncov/if-you-are-sick/care-for-someone.html  [facility] -- Interim Guidance for Hospital Discharge to Home: www.[facility].[place]./diseases/coronavirus/hcp/hospdischarge.pdf  [facility] clinical trials (COVID-19 research studies): clinicalaffairs.[facility]-clinical-trials  Below are the COVID-19 hotlines at the [place] Department of [facility] ([facility]). Interpreters are available.  For health questions: Call 085-820-8384 or 1-565.375.1215 (7 a.m. to 7 p.m.)  For questions about schools and childcare: Call 197-023-4711 or 1-956.314.1309 (7 a.m. to 7 p.m.)    Diagnosis: Contact with and (suspected) exposure to other viral communicable diseases  Diagnosis ICD: Z20.828

## 2020-11-12 DIAGNOSIS — Z20.822 SUSPECTED 2019 NOVEL CORONAVIRUS INFECTION: Primary | ICD-10-CM

## 2020-11-12 DIAGNOSIS — Z20.822 SUSPECTED COVID-19 VIRUS INFECTION: ICD-10-CM

## 2020-11-12 PROCEDURE — U0003 INFECTIOUS AGENT DETECTION BY NUCLEIC ACID (DNA OR RNA); SEVERE ACUTE RESPIRATORY SYNDROME CORONAVIRUS 2 (SARS-COV-2) (CORONAVIRUS DISEASE [COVID-19]), AMPLIFIED PROBE TECHNIQUE, MAKING USE OF HIGH THROUGHPUT TECHNOLOGIES AS DESCRIBED BY CMS-2020-01-R: HCPCS | Performed by: PHYSICIAN ASSISTANT

## 2020-11-13 LAB
SARS-COV-2 RNA SPEC QL NAA+PROBE: NOT DETECTED
SPECIMEN SOURCE: NORMAL

## 2020-12-06 ENCOUNTER — HEALTH MAINTENANCE LETTER (OUTPATIENT)
Age: 20
End: 2020-12-06

## 2021-01-15 ENCOUNTER — HEALTH MAINTENANCE LETTER (OUTPATIENT)
Age: 21
End: 2021-01-15

## 2021-03-05 ENCOUNTER — OFFICE VISIT (OUTPATIENT)
Dept: FAMILY MEDICINE | Facility: CLINIC | Age: 21
End: 2021-03-05
Payer: COMMERCIAL

## 2021-03-05 VITALS
WEIGHT: 127.4 LBS | BODY MASS INDEX: 23.3 KG/M2 | HEART RATE: 70 BPM | TEMPERATURE: 98.1 F | OXYGEN SATURATION: 98 % | SYSTOLIC BLOOD PRESSURE: 102 MMHG | DIASTOLIC BLOOD PRESSURE: 68 MMHG | RESPIRATION RATE: 14 BRPM

## 2021-03-05 DIAGNOSIS — R19.7 BLOODY DIARRHEA: Primary | ICD-10-CM

## 2021-03-05 DIAGNOSIS — Z30.09 GENERAL COUNSELING AND ADVICE FOR CONTRACEPTIVE MANAGEMENT: ICD-10-CM

## 2021-03-05 LAB
ALBUMIN SERPL-MCNC: 4.1 G/DL (ref 3.4–5)
ALP SERPL-CCNC: 49 U/L (ref 40–150)
ALT SERPL W P-5'-P-CCNC: 22 U/L (ref 0–50)
ANION GAP SERPL CALCULATED.3IONS-SCNC: 4 MMOL/L (ref 3–14)
AST SERPL W P-5'-P-CCNC: 20 U/L (ref 0–45)
BASOPHILS # BLD AUTO: 0 10E9/L (ref 0–0.2)
BASOPHILS NFR BLD AUTO: 0.8 %
BILIRUB SERPL-MCNC: 0.4 MG/DL (ref 0.2–1.3)
BUN SERPL-MCNC: 11 MG/DL (ref 7–30)
CALCIUM SERPL-MCNC: 9.3 MG/DL (ref 8.5–10.1)
CHLORIDE SERPL-SCNC: 108 MMOL/L (ref 94–109)
CO2 SERPL-SCNC: 27 MMOL/L (ref 20–32)
CREAT SERPL-MCNC: 0.73 MG/DL (ref 0.52–1.04)
DIFFERENTIAL METHOD BLD: NORMAL
EOSINOPHIL # BLD AUTO: 0.1 10E9/L (ref 0–0.7)
EOSINOPHIL NFR BLD AUTO: 2.3 %
ERYTHROCYTE [DISTWIDTH] IN BLOOD BY AUTOMATED COUNT: 12.6 % (ref 10–15)
GFR SERPL CREATININE-BSD FRML MDRD: >90 ML/MIN/{1.73_M2}
GLUCOSE SERPL-MCNC: 84 MG/DL (ref 70–99)
HCT VFR BLD AUTO: 41.9 % (ref 35–47)
HGB BLD-MCNC: 13.7 G/DL (ref 11.7–15.7)
LYMPHOCYTES # BLD AUTO: 1.5 10E9/L (ref 0.8–5.3)
LYMPHOCYTES NFR BLD AUTO: 29.8 %
MCH RBC QN AUTO: 28 PG (ref 26.5–33)
MCHC RBC AUTO-ENTMCNC: 32.7 G/DL (ref 31.5–36.5)
MCV RBC AUTO: 86 FL (ref 78–100)
MONOCYTES # BLD AUTO: 0.4 10E9/L (ref 0–1.3)
MONOCYTES NFR BLD AUTO: 6.8 %
NEUTROPHILS # BLD AUTO: 3.1 10E9/L (ref 1.6–8.3)
NEUTROPHILS NFR BLD AUTO: 60.3 %
PLATELET # BLD AUTO: 262 10E9/L (ref 150–450)
POTASSIUM SERPL-SCNC: 4.1 MMOL/L (ref 3.4–5.3)
PROT SERPL-MCNC: 7.5 G/DL (ref 6.8–8.8)
RBC # BLD AUTO: 4.89 10E12/L (ref 3.8–5.2)
SODIUM SERPL-SCNC: 139 MMOL/L (ref 133–144)
WBC # BLD AUTO: 5.1 10E9/L (ref 4–11)

## 2021-03-05 PROCEDURE — 99214 OFFICE O/P EST MOD 30 MIN: CPT | Performed by: PHYSICIAN ASSISTANT

## 2021-03-05 PROCEDURE — 36415 COLL VENOUS BLD VENIPUNCTURE: CPT | Performed by: PHYSICIAN ASSISTANT

## 2021-03-05 PROCEDURE — 80053 COMPREHEN METABOLIC PANEL: CPT | Performed by: PHYSICIAN ASSISTANT

## 2021-03-05 PROCEDURE — 85025 COMPLETE CBC W/AUTO DIFF WBC: CPT | Performed by: PHYSICIAN ASSISTANT

## 2021-03-05 NOTE — PATIENT INSTRUCTIONS
I will notify you of lab results via [employer]   Follow up with gastroenterology for bloody diarrhea  Bring in stool samples  Consider birth control options and follow up with us for routine preventative exam

## 2021-03-05 NOTE — PROGRESS NOTES
Assessment & Plan     Bloody diarrhea  Rule out infectious etiology although unlikely.  Follow up with gastroenterologist  If unimproved.  Consider colonoscopy - unsure if possibly colitis - unknown family history   - CBC with platelets and differential  - Clostridium difficile toxin B PCR  - Enteric Bacteria and Virus Panel by [facility] Stool  - Ova and Parasite Exam Routine  - Comprehensive metabolic panel (BMP + Alb, Alk Phos, ALT, AST, Total. Bili, TP)  - GASTROENTEROLOGY ADULT REF CONSULT ONLY    General counseling for contraceptive management  Reviewed all options - pill, nuva ring, iud, nexplanon, depoprovera  Pros and cons- she will consider and follow up with us as needed  - since never sexually active did not perform pap today           Patient Instructions   I will notify you of lab results via MyChart   Follow up with gastroenterology for bloody diarrhea  Bring in stool samples  Consider birth control options and follow up with us for routine preventative exam        Return in about 4 weeks (around 4/2/2021), or if symptoms worsen or fail to improve, for in person, Routine preventive.    Dina Bundy PA-C  [facility]    Rosario Mark is a 20 year old who presents for the following health issues     HPI         Blood in stools x 1 week   Works as a    Noted bright red blood per rectum around period - off period since Monday and today normal and had diarrheal stool  with blood around it.  Before red tinge with wiping   No abdominal pain.  No vomiiting.  Nausea usually comes with menses  Stays well hydrated.   History of psoriatic arthritis-Haven't been taking methotrexate.  Not seen rheumatology since last year   I dont' know my family history since I am adopted   Had one drink last Friday night (one week ago)  Engaged to be  next summer - has never had intercourse- wonders about contraception options   Usually One stool a day- light brown    Wonders about hemorrhoids- uncomfortable right now.    No travel. No recent antibiotic use     Review of Systems   Constitutional, HEENT, cardiovascular, pulmonary, gi and gu systems are negative, except as otherwise noted.      Objective    /68   Pulse 70   Temp 98.1  F (36.7  C) (Oral)   Resp 14   Wt 57.8 kg (127 lb 6.4 oz)   LMP 02/25/2021 (Exact Date)   SpO2 98%   BMI 23.30 kg/m    Body mass index is 23.3 kg/m .  Physical Exam   GENERAL: healthy, alert and no distress  NECK: no adenopathy, no asymmetry, masses, or scars and thyroid normal to palpation  RESP: lungs clear to auscultation - no rales, rhonchi or wheezes  CV: regular rate and rhythm, normal S1 S2, no S3 or S4, no murmur, click or rub, no peripheral edema and peripheral pulses strong  ABDOMEN: soft, nontender, no hepatosplenomegaly, no masses and bowel sounds normal  RECTAL (female): normal sphincter tone, no rectal masses and no hemorrhoid visualized   MS: no gross musculoskeletal defects noted, no edema    Results for orders placed or performed in visit on 03/05/21   CBC with platelets and differential     Status: None   Result Value Ref [facility]    WBC 5.1 4.0 - 11.0 10e9/L    RBC Count 4.89 3.8 - 5.2 10e12/L    Hemoglobin 13.7 11.7 - 15.7 g/dL    Hematocrit 41.9 35.0 - 47.0 %    MCV 86 78 - 100 fl    MCH 28.0 26.5 - 33.0 pg    MCHC 32.7 31.5 - 36.5 g/dL    RDW 12.6 10.0 - 15.0 %    Platelet Count 262 150 - 450 10e9/L    % Neutrophils 60.3 %    % Lymphocytes 29.8 %    % Monocytes 6.8 %    % Eosinophils 2.3 %    % Basophils 0.8 %    Absolute Neutrophil 3.1 1.6 - 8.3 10e9/L    Absolute Lymphocytes 1.5 0.8 - 5.3 10e9/L    Absolute Monocytes 0.4 0.0 - 1.3 10e9/L    Absolute Eosinophils 0.1 0.0 - 0.7 10e9/L    Absolute Basophils 0.0 0.0 - 0.2 10e9/L    Diff Method Automated Method    Comprehensive metabolic panel (BMP + Alb, Alk Phos, ALT, AST, Total. Bili, TP)     Status: None   Result Value Ref [facility]    Sodium 139 133 - 144 mmol/L     Potassium 4.1 3.4 - 5.3 mmol/L    Chloride 108 94 - 109 mmol/L    Carbon Dioxide 27 20 - 32 mmol/L    Anion Gap 4 3 - 14 mmol/L    Glucose 84 70 - 99 mg/dL    Urea Nitrogen 11 7 - 30 mg/dL    Creatinine 0.73 0.52 - 1.04 mg/dL    GFR Estimate >90 >60 mL/min/[1.73_m2]    GFR Estimate If Black >90 >60 mL/min/[1.73_m2]    Calcium 9.3 8.5 - 10.1 mg/dL    Bilirubin Total 0.4 0.2 - 1.3 mg/dL    Albumin 4.1 3.4 - 5.0 g/dL    Protein Total 7.5 6.8 - 8.8 g/dL    Alkaline Phosphatase 49 40 - 150 U/L    ALT 22 0 - 50 U/L    AST 20 0 - 45 U/L

## 2021-03-05 NOTE — RESULT ENCOUNTER NOTE
Dear Deedee  Your electrolytes, blood sugar, kidney function and liver function were normal.   Your blood counts and hemoglobin were normal.   Please call or MyChart my office with any questions or concerns.   Dina Bundy, PAC

## 2021-03-06 PROCEDURE — 87177 OVA AND PARASITES SMEARS: CPT | Performed by: PHYSICIAN ASSISTANT

## 2021-03-06 PROCEDURE — 87506 IADNA-DNA/RNA PROBE TQ 6-11: CPT | Performed by: PHYSICIAN ASSISTANT

## 2021-03-06 PROCEDURE — 87209 SMEAR COMPLEX STAIN: CPT | Performed by: PHYSICIAN ASSISTANT

## 2021-03-07 DIAGNOSIS — R19.7 BLOODY DIARRHEA: ICD-10-CM

## 2021-03-08 LAB
O+P STL MICRO: NORMAL
O+P STL MICRO: NORMAL
SPECIMEN SOURCE: NORMAL

## 2021-03-08 NOTE — RESULT ENCOUNTER NOTE
Dear Deedee  Your stool did not show any parasites.   Please call or MyChart my office with any questions or concerns.    Dina Bundy, PAC

## 2021-03-08 NOTE — RESULT ENCOUNTER NOTE
Dear Deedee  Your stool did not grow out bacteria so far.   Some of the testing is still pending.   Please call or MyChart my office with any questions or concerns.   Dina Bundy, PAC

## 2021-03-18 ENCOUNTER — E-VISIT (OUTPATIENT)
Dept: URGENT CARE | Facility: CLINIC | Age: 21
End: 2021-03-18
Payer: COMMERCIAL

## 2021-03-18 DIAGNOSIS — Z20.822 SUSPECTED COVID-19 VIRUS INFECTION: Primary | ICD-10-CM

## 2021-03-18 PROCEDURE — 99421 OL DIG E/M SVC 5-10 MIN: CPT | Performed by: PHYSICIAN ASSISTANT

## 2021-03-18 NOTE — PATIENT INSTRUCTIONS
Dear Deedee Tyson,    Your symptoms show that you may have coronavirus (COVID-19). This illness can cause fever, cough and trouble breathing. Many people get a mild case and get better on their own. Some people can get very sick.    Will I be tested for COVID-19?  We would like to test you for Covid-19 virus. I have placed orders for this test.     To schedule: go to your [facility] home page and scroll down to the section that says  You have an appointment that needs to be scheduled  and click the large green button that says  Schedule Now  and follow the steps to find the next available openings.    If you are unable to complete these [facility] scheduling steps, please call [phone] to schedule your testing.     Return to work/school/ guidance:  Please let your workplace manager and staffing office know when your quarantine ends     We can t give you an exact date as it depends on the above. You can calculate this on your own or work with your manager/staffing office to calculate this. (For example if you were exposed on 10/4, you would have to quarantine for 14 full days. That would be through 10/18. You could return on 10/19.)      If you receive a positive COVID-19 test result, follow the guidance of the those who are giving you the results. Usually the return to work is 10 (or in some cases 20 days from symptom onset.) If you work at [facility], you must also be cleared by Employee Occupational Health and Safety to return to work.        If you receive a negative COVID-19 test result and did not have a high risk exposure to someone with a known positive COVID-19 test, you can return to work once you're free of fever for 24 hours without fever-reducing medication and your symptoms are improving or resolved.      If you receive a negative COVID-19 test and If you had a high risk exposure to someone who has tested positive for COVID-19 then you can return to work 14 days after your last contact with  the positive individual    Note: If you have ongoing exposure to the covid positive person, this quarantine period may be more than 14 days. (For example, if you are continued to be exposed to your child who tested positive and cannot isolate from them, then the quarantine of 7-14 days can't start until your child is no longer contagious. This is typically 10 days from onset of the child's symptoms. So the total duration may be 17-24 days in this case.)    Sign up for [employer].   We know it's scary to hear that you might have COVID-19. We want to track your symptoms to make sure you're okay over the next 2 weeks. Please look for an email from [employer]--this is a free, online program that we'll use to keep in touch. To sign up, follow the link in the email you will receive. Learn more at [email or web address]    How can I take care of myself?    Get lots of rest. Drink extra fluids (unless a doctor has told you not to)    Take Tylenol (acetaminophen) or ibuprofen for fever or pain. If you have liver or kidney problems, ask your family doctor if it's okay to take Tylenol o ibuprofen    If you have other health problems (like cancer, heart failure, an organ transplant or severe kidney disease): Call your specialty clinic if you don't feel better in the next 2 days.    Know when to call 911. Emergency warning signs include:  o Trouble breathing or shortness of breath  o Pain or pressure in the chest that doesn't go away  o Feeling confused like you haven't felt before, or not being able to wake up  o Bluish-colored lips or face    Where can I get more information?   [employer] - About COVID-19:   [email or web address]    CDC - What to Do If You're Sick:   www.cdc.gov/coronavirus/2019-ncov/about/steps-when-sick.html    March 18, 2021  RE:  Deedee ESCOBEDO Suiter                                                                                                                  [id number]  [place] [address]      To whom it may concern:    I evaluated Deedee Tyson on March 18, 2021. Deedee Tyson should be excused from work/school.     They should let their workplace manager and staffing office know when their quarantine ends.    We can not give an exact date as it depends on the information below. They can calculate this on their own or work with their manager/staffing office to calculate this. (For example if they were exposed on 10/04, they would have to quarantine for 14 full days. That would be through 10/18. They could return on 10/19.)    Quarantine Guidelines:      If patient receives a positive COVID-19 test result, they should follow the guidance of those who are giving the results. Usually the return to work is 10 (or in some cases 20 days from symptom onset.) If they work at [employer], they must be cleared by Employee Occupational Health and Safety to return to work.        If patient receives a negative COVID-19 test result and did not have a high risk exposure to someone with a known positive COVID-19 test, they can return to work once they're free of fever for 24 hours without fever-reducing medication and their symptoms are improving or resolved.      If patient receives a negative COVID-19 test and if they had a high risk exposure to someone who has tested positive for COVID-19 then they can return to work 14 days after their last contact with the positive individual    Note: If there is ongoing exposure to the covid positive person, this quarantine period may be longer than 14 days. (For example, if they are continually exposed to their child, who tested positive and cannot isolate from them, then the quarantine of 7-14 days can't start until their child is no longer contagious. This is typically 10 days from onset to the child's symptoms. So the total duration may be 17-24 days in this case.)     Sincerely,  Veronica Avendaño PA-C

## 2021-03-20 DIAGNOSIS — Z20.822 SUSPECTED COVID-19 VIRUS INFECTION: ICD-10-CM

## 2021-03-20 LAB
SARS-COV-2 RNA RESP QL NAA+PROBE: NORMAL
SPECIMEN SOURCE: NORMAL

## 2021-03-20 PROCEDURE — U0005 INFEC AGEN DETEC AMPLI PROBE: HCPCS | Performed by: PHYSICIAN ASSISTANT

## 2021-03-20 PROCEDURE — U0003 INFECTIOUS AGENT DETECTION BY NUCLEIC ACID (DNA OR RNA); SEVERE ACUTE RESPIRATORY SYNDROME CORONAVIRUS 2 (SARS-COV-2) (CORONAVIRUS DISEASE [COVID-19]), AMPLIFIED PROBE TECHNIQUE, MAKING USE OF HIGH THROUGHPUT TECHNOLOGIES AS DESCRIBED BY CMS-2020-01-R: HCPCS | Performed by: PHYSICIAN ASSISTANT

## 2021-03-21 LAB
LABORATORY COMMENT REPORT: NORMAL
SARS-COV-2 RNA RESP QL NAA+PROBE: NEGATIVE
SPECIMEN SOURCE: NORMAL

## 2021-03-30 NOTE — TELEPHONE ENCOUNTER
REFERRAL INFORMATION:    Referring Provider:  Dina Bundy PA-C    Referring Clinic: [facility]     Reason for Visit/Diagnosis: Bloody diarrhea     FUTURE VISIT INFORMATION:    Appointment Date: 4/16/2021    Appointment Time: 8 AM      NOTES STATUS DETAILS   OFFICE NOTE from Referring Provider Internal 3/5/2021 Office visit with Dina Bundy PA-C   OFFICE NOTE from Other Specialist N/A    HOSPITAL DISCHARGE SUMMARY/  ED VISITS N/A    OPERATIVE REPORT N/A    MEDICATION LIST Internal         ENDOSCOPY  N/A    COLONOSCOPY N/A    ERCP N/A    EUS N/A    STOOL TESTING Internal 3/6/2021, 3/5/2021   PERTINENT LABS Internal    PATHOLOGY REPORTS (RELATED) N/A    IMAGING (CT, MRI, EGD, MRCP, Small Bowel Follow Through/SBT, MR/CT Enterography) N/A

## 2021-04-15 NOTE — PROGRESS NOTES
"Deedee Tyson is a 20 year old female who is being evaluated via a billable video visit.      The patient has been notified of following:     \"This video visit will be conducted via a call between you and your physician/provider. We have found that certain health care needs can be provided without the need for an in-person physical exam.  This service lets us provide the care you need with a video conversation.  If a prescription is necessary we can send it directly to your pharmacy.  If lab work is needed we can place an order for that and you can then stop by our lab to have the test done at a later time.    If during the course of the call the physician/provider feels a video visit is not appropriate, you will not be charged for this service.\"     Patient confirmed that they are in [place] for today's visit yes    Video-Visit Details  Type of service:  Video Visit    Video Start Time: 759a  Video End Time:  831a    Originating Location (pt. Location): [place]    Distant Location (provider location):  [facility] [place]     Platform used: [facility]            GI CLINIC VISIT    CC/REFERRING PROVIDER: iDna Bundy  REASON FOR CONSULTATION: bloody diarrhea    HPI: 20 year old adopted female with PMH of psoriasis with psoriatic arthritis presenting to GI clinic for blood in stools.    Deedee notes that she has had blood in the stool with loose stool, ongoing intermittently with periods for a few. Recently, she had an episode independent of her menstrual cycle, with blood noted off and on. Blood present with wiping fairly commonly. When it seems to be more prominent, she will note blood streaking the stool.    Bowel habits - baseline somewhat variable, every 1-2 days. Consticency is soft and formed without dry, hard stools. Occasional straining. She will have periods of loose stool intermittently, usually one episode per day when present. There is discomfort following bowel movements " in the anal area and generalized abdomen, to the point she feels like she needs to lay down. No abdominal pain otherwise. She does note some bloating particularly around her period. No sense of incomplete evacuation, nocturnal awakenings, or unintentional weight loss. Getting  in July and losing weight with intention.    No nausea, vomiting. She does have intermittent reflux episodes. No dysphagia, odynophagia, early satiety.    CBC, CMP unremarkable. Enteric panel, O and P negative.     No NSAID use recently, had been on meloxicam previously, last use 12 months.    Adopted - no known family history    ROS: 10pt ROS performed and otherwise negative.    PAST MEDICAL HISTORY:  Past Medical History:   Diagnosis Date     Adopted     [place] at 16 months     Seasonal allergies        PREVIOUS ABDOMINAL/GYNECOLOGIC SURGERIES:    Past Surgical History:   Procedure Laterality Date     TONSILLECTOMY N/A 12/26/2017    Procedure: TONSILLECTOMY;  TONSILLECTOMY;  Surgeon: Alexis Mckinnon MD;  Location: [facility] OR     wisdom teeth           PERTINENT MEDICATIONS:  Current Outpatient Medications   Medication Sig Dispense Refill     meloxicam (MOBIC) 7.5 MG tablet Take 1.5 tabs by mouth daily with food. (Patient not taking: Reported on 3/5/2021) 135 tablet 1     methotrexate 2.5 MG tablet Take 6 tablets (15 mg) by mouth once a week (Patient not taking: Reported on 3/5/2021) 72 tablet 1       SOCIAL HISTORY:  Smoking: no  EtOH: one drink per week    Social History     Socioeconomic History     Marital status: Single     Spouse name: Not on file     Number of children: Not on file     Years of education: Not on file     Highest education level: Not on file   Occupational History     Not on file   Social Needs     Financial resource strain: Not on file     Food insecurity     Worry: Not on file     Inability: Not on file     Transportation needs     Medical: Not on file     Non-medical: Not on file   Tobacco Use     Smoking status:  "Never Smoker     Smokeless tobacco: Never Used     Tobacco comment: no smokers in the house   Substance and Sexual Activity     Alcohol use: No     Alcohol/week: 0.0 standard drinks     Drug use: No     Sexual activity: Never   Lifestyle     Physical activity     Days per week: Not on file     Minutes per session: Not on file     Stress: Not on file   Relationships     Social connections     Talks on phone: Not on file     Gets together: Not on file     Attends Scientology service: Not on file     Active member of club or organization: Not on file     Attends meetings of clubs or organizations: Not on file     Relationship status: Not on file     Intimate partner violence     Fear of current or ex partner: Not on file     Emotionally abused: Not on file     Physically abused: Not on file     Forced sexual activity: Not on file   Other Topics Concern     Not on file   Social History Narrative    (6/2015) Lives in [place], [place] with mother (non-biologic) and 3 brothers and 2 sisters.  3 other grown children are now out of the house.  She has 1 niece, 3 nephews and another niece/nephew on the way.  Mom and dad recently going through separation.  Stress of this really hist about February 2015.  Family has a dog.  Deedee also has a horse named \"Louie\" she loves.  She does trail riding.  Also likes baking/cooking; being outside.  Just finished 10th grade at [employer].  Mom (Dara) is retired.  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          Up date (6/2018) Deedee finished freshman year at [employer]; plans to pursue Biochem major and perhaps graduate in 3 years.  Working at [employer].         FAMILY HISTORY:  Family History   Adopted: Yes   Problem Relation Age of Onset     Unknown/Adopted Mother      Unknown/Adopted Father        PHYSICAL EXAMINATION:  Vitals reviewed  There were no vitals taken for this visit.  Video physical exam  General: Patient appears well in no acute distress.   Skin: No " visualized rash or lesions on visualized skin  Eyes: EOMI, no erythema, sclera icterus or discharge noted  Resp: Appears to be breathing comfortably without accessory muscle usage, speaking in full sentences, no cough  MSK: Appears to have normal range of motion based on visualized movements  Neurologic: No apparent tremors, facial movements symmetric  Psych: affect normal, alert and oriented    The rest of a comprehensive physical examination is deferred due to PHE (public health emergency) video restrictions      PERTINENT STUDIES Reviewed in EMR    ASSESSMENT/PLAN:  20 year old adopted female with PMH of psoriasis with psoriatic arthritis presenting to GI clinic for blood in stools.    # Irregular bowel habits  # BRBPR  # Abdominal discomfort  Several year history of intermittent blood in stool with recent worsening, accompanied by intermittent loose stools in the background of bowel pattern consistent with constipation. Work-up negative for COVID-19, enteric pathogens was negative. CBC, CMP unremarkable.    She may be at increased risk of IBD given underlying psoriatic arthritis. Differential for bleeding also includes hemorrhoidal bleeding, anal fissure. Bowel pattern most consistent with constipation, which  May represent IBS, CIC, with diarrheal symptoms possibly secondary to overflow.    Plan for EGD/colonoscopy. In the meantime, start soluble fiber supplementation.    RTC 6-8 weeks    Thank you for this consultation. It was a pleasure to participate in the care of this patient; please contact us with any further questions.    Elizabeth Toney PA-C    40 minutes spent on the date of the encounter doing chart review, review of test results, patient visit and documentation

## 2021-04-16 ENCOUNTER — PRE VISIT (OUTPATIENT)
Dept: GASTROENTEROLOGY | Facility: CLINIC | Age: 21
End: 2021-04-16

## 2021-04-16 ENCOUNTER — VIRTUAL VISIT (OUTPATIENT)
Dept: GASTROENTEROLOGY | Facility: CLINIC | Age: 21
End: 2021-04-16
Attending: PHYSICIAN ASSISTANT
Payer: COMMERCIAL

## 2021-04-16 DIAGNOSIS — R19.7 BLOODY DIARRHEA: ICD-10-CM

## 2021-04-16 DIAGNOSIS — K92.1 BLOOD IN STOOL: Primary | ICD-10-CM

## 2021-04-16 DIAGNOSIS — R19.8 IRREGULAR BOWEL HABITS: ICD-10-CM

## 2021-04-16 PROCEDURE — 99203 OFFICE O/P NEW LOW 30 MIN: CPT | Mod: 95 | Performed by: PHYSICIAN ASSISTANT

## 2021-04-16 NOTE — LETTER
"    4/16/2021         RE: Deedee Tyson  [address]  [place] [address]        Dear Colleague,    Thank you for referring your patient, Deedee Tyson, to the [facility] [place]. Please see a copy of my visit note below.    Deedee Tyson is a 20 year old female who is being evaluated via a billable video visit.      The patient has been notified of following:     \"This video visit will be conducted via a call between you and your physician/provider. We have found that certain health care needs can be provided without the need for an in-person physical exam.  This service lets us provide the care you need with a video conversation.  If a prescription is necessary we can send it directly to your pharmacy.  If lab work is needed we can place an order for that and you can then stop by our lab to have the test done at a later time.    If during the course of the call the physician/provider feels a video visit is not appropriate, you will not be charged for this service.\"     Patient confirmed that they are in [place] for today's visit yes    Video-Visit Details  Type of service:  Video Visit    Video Start Time: 759a  Video End Time:  831a    Originating Location (pt. Location): Home    Distant Location (provider location):  [facility] [place]     Platform used: [employer]            GI CLINIC VISIT    CC/REFERRING PROVIDER: Dina Bundy  REASON FOR CONSULTATION: bloody diarrhea    HPI: 20 year old adopted female with PMH of psoriasis with psoriatic arthritis presenting to GI clinic for blood in stools.    Deedee notes that she has had blood in the stool with loose stool, ongoing intermittently with periods for a few. Recently, she had an episode independent of her menstrual cycle, with blood noted off and on. Blood present with wiping fairly commonly. When it seems to be more prominent, she will note blood streaking the stool.    Bowel " habits - baseline somewhat variable, every 1-2 days. Consticency is soft and formed without dry, hard stools. Occasional straining. She will have periods of loose stool intermittently, usually one episode per day when present. There is discomfort following bowel movements in the anal area and generalized abdomen, to the point she feels like she needs to lay down. No abdominal pain otherwise. She does note some bloating particularly around her period. No sense of incomplete evacuation, nocturnal awakenings, or unintentional weight loss. Getting  in July and losing weight with intention.    No nausea, vomiting. She does have intermittent reflux episodes. No dysphagia, odynophagia, early satiety.    CBC, CMP unremarkable. Enteric panel, O and P negative.     No NSAID use recently, had been on meloxicam previously, last use 12 months.    Adopted - no known family history    ROS: 10pt ROS performed and otherwise negative.    PAST MEDICAL HISTORY:  Past Medical History:   Diagnosis Date     Adopted     [place] at 16 months     Seasonal allergies        PREVIOUS ABDOMINAL/GYNECOLOGIC SURGERIES:    Past Surgical History:   Procedure Laterality Date     TONSILLECTOMY N/A 12/26/2017    Procedure: TONSILLECTOMY;  TONSILLECTOMY;  Surgeon: Alexis Mckinnon MD;  Location: [facility]     wisdom teeth           PERTINENT MEDICATIONS:  Current Outpatient Medications   Medication Sig Dispense Refill     meloxicam (MOBIC) 7.5 MG tablet Take 1.5 tabs by mouth daily with food. (Patient not taking: Reported on 3/5/2021) 135 tablet 1     methotrexate 2.5 MG tablet Take 6 tablets (15 mg) by mouth once a week (Patient not taking: Reported on 3/5/2021) 72 tablet 1       SOCIAL HISTORY:  Smoking: no  EtOH: one drink per week    Social History     Socioeconomic History     Marital status: Single     Spouse name: Not on file     Number of children: Not on file     Years of education: Not on file     Highest education level: Not on file  "  Occupational History     Not on file   Social Needs     Financial resource strain: Not on file     Food insecurity     Worry: Not on file     Inability: Not on file     Transportation needs     Medical: Not on file     Non-medical: Not on file   Tobacco Use     Smoking status: Never Smoker     Smokeless tobacco: Never Used     Tobacco comment: no smokers in the house   Substance and Sexual Activity     Alcohol use: No     Alcohol/week: 0.0 standard drinks     Drug use: No     Sexual activity: Never   Lifestyle     Physical activity     Days per week: Not on file     Minutes per session: Not on file     Stress: Not on file   Relationships     Social connections     Talks on phone: Not on file     Gets together: Not on file     Attends Amish service: Not on file     Active member of club or organization: Not on file     Attends meetings of clubs or organizations: Not on file     Relationship status: Not on file     Intimate partner violence     Fear of current or ex partner: Not on file     Emotionally abused: Not on file     Physically abused: Not on file     Forced sexual activity: Not on file   Other Topics Concern     Not on file   Social History Narrative    (6/2015) Lives in [place], [place] with mother (non-biologic) and 3 brothers and 2 sisters.  3 other grown children are now out of the house.  She has 1 niece, 3 nephews and another niece/nephew on the way.  Mom and dad recently going through separation.  Stress of this really hist about February 2015.  Family has a dog.  Deedee also has a horse named \"Louie\" she loves.  She does trail riding.  Also likes baking/cooking; being outside.  Just finished 10th grade at [employer].  Mom (Dara) is retired.  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          Up date (6/2018) Deedee finished freshman year at [employer]; plans to pursue Biochem major and perhaps graduate in 3 years.  Working at [employer].         FAMILY HISTORY:  Family History "   Adopted: Yes   Problem Relation Age of Onset     Unknown/Adopted Mother      Unknown/Adopted Father        PHYSICAL EXAMINATION:  Vitals reviewed  There were no vitals taken for this visit.  Video physical exam  General: Patient appears well in no acute distress.   Skin: No visualized rash or lesions on visualized skin  Eyes: EOMI, no erythema, sclera icterus or discharge noted  Resp: Appears to be breathing comfortably without accessory muscle usage, speaking in full sentences, no cough  MSK: Appears to have normal range of motion based on visualized movements  Neurologic: No apparent tremors, facial movements symmetric  Psych: affect normal, alert and oriented    The rest of a comprehensive physical examination is deferred due to PHE (public health emergency) video restrictions      PERTINENT STUDIES Reviewed in EMR    ASSESSMENT/PLAN:  20 year old adopted female with PMH of psoriasis with psoriatic arthritis presenting to GI clinic for blood in stools.    # Irregular bowel habits  # BRBPR  # Abdominal discomfort  Several year history of intermittent blood in stool with recent worsening, accompanied by intermittent loose stools in the background of bowel pattern consistent with constipation. Work-up negative for COVID-19, enteric pathogens was negative. CBC, CMP unremarkable.    She may be at increased risk of IBD given underlying psoriatic arthritis. Differential for bleeding also includes hemorrhoidal bleeding, anal fissure. Bowel pattern most consistent with constipation, which  May represent IBS, CIC, with diarrheal symptoms possibly secondary to overflow.    Plan for EGD/colonoscopy. In the meantime, start soluble fiber supplementation.    RTC 6-8 weeks    Thank you for this consultation. It was a pleasure to participate in the care of this patient; please contact us with any further questions.    Elizabeth Toney PA-C    40 minutes spent on the date of the encounter doing chart review, review of test  results, patient visit and documentation

## 2021-04-16 NOTE — PATIENT INSTRUCTIONS
It was a pleasure taking care of you today.  I've included a brief summary of our discussion and care plan from today's visit below.  Please review this information with your primary care provider.  _______________________________________________________________________    My recommendations are summarized as follows:    -- Upper endoscopy and colonoscopy. If you have not heard from endoscopy within a 3-5 ays, please call [phone] to schedule.   -- Recommend starting supplementation with soluble fiber. When used on a daily basis, this can help regulate the consistency of your stools. Metamucil and Citrucel are preferred examples. You can start with 1 teaspoon per day, with goal to gradually increase to 1 tablespoon daily. It is important to stay well-hydrated with use of fiber supplementation and to make sure that the fiber powder is well mixed with water as directed on the label. You may experience some bloating with initiation of fiber, which will improve over the first few weeks. A good trial to evaluate the effect is 3-6 months.    Return to GI Clinic in 6-8 weeks to review your progress.    ______________________________________________________________________    How do I schedule labs, imaging studies, or procedures that were ordered in clinic today?     Labs: To schedule lab appointment at the [facility], use my chart or call [phone]. If you have a [facility] lab closer to home where you are regularly seen you can give them a call.     Procedures: If a colonoscopy, upper endoscopy, breath test, esophageal manometry, or pH impedence was ordered today, our endoscopy team will call you to schedule this. If you have not heard from our endoscopy team within a week, please call [phone] to schedule.     Imaging Studies: If you were scheduled for a CT scan, X-ray, MRI, ultrasound, HIDA scan or other imaging study, please call [phone] to have this scheduled.     Referral: If a  referral to another specialty was ordered, expect a phone call or follow instructions above. If you have not heard from anyone regarding your referral in a week, please call our clinic to check the status.     Who do I call with any questions after my visit?  Please be in touch if there are any further questions that arise following today's visit.  There are multiple ways to contact your gastroenterology care team.        During business hours, you may reach a Gastroenterology nurse at [phone]      To schedule or reschedule an appointment, please call [phone].       You can always send a secure message through In Ovo.  In Ovo messages are answered by your nurse or doctor typically within 24 hours.  Please allow extra time on weekends and holidays.        For urgent/emergent questions after business hours, you may reach the on-call GI Fellow by contacting the [facility]  at [phone].     How will I get the results of any tests ordered?    You will receive all of your results.  If you have signed up for [employer], any tests ordered at your visit will be available to you after your physician reviews them.  Typically this takes 1-2 weeks.  If there are urgent results that require a change in your care plan, your physician or nurse will call you to discuss the next steps.      What is In Ovo?  In Ovo is a secure way for you to access all of your healthcare records from the [facility].  It is a web based computer program, so you can sign on to it from any location.  It also allows you to send secure messages to your care team.  I recommend signing up for In Ovo access if you have not already done so and are comfortable with using a computer.      How to I schedule a follow-up visit?  If you did not schedule a follow-up visit today, please call [phone] to schedule a follow-up office visit.      Sincerely,    Elizabeth Toney PA-C  Division of Gastroenterology, Hepatology  & Nutrition  [facility]

## 2021-05-14 ENCOUNTER — HOSPITAL ENCOUNTER (OUTPATIENT)
Facility: AMBULATORY SURGERY CENTER | Age: 21
End: 2021-05-14
Attending: INTERNAL MEDICINE
Payer: COMMERCIAL

## 2021-05-14 DIAGNOSIS — Z12.11 SCREEN FOR COLON CANCER: Primary | ICD-10-CM

## 2021-05-14 DIAGNOSIS — Z11.59 ENCOUNTER FOR SCREENING FOR OTHER VIRAL DISEASES: ICD-10-CM

## 2021-05-19 ENCOUNTER — OFFICE VISIT (OUTPATIENT)
Dept: FAMILY MEDICINE | Facility: OTHER | Age: 21
End: 2021-05-19
Payer: COMMERCIAL

## 2021-05-19 VITALS
WEIGHT: 129 LBS | DIASTOLIC BLOOD PRESSURE: 66 MMHG | HEART RATE: 83 BPM | OXYGEN SATURATION: 98 % | TEMPERATURE: 97.8 F | RESPIRATION RATE: 16 BRPM | BODY MASS INDEX: 23.59 KG/M2 | SYSTOLIC BLOOD PRESSURE: 92 MMHG

## 2021-05-19 DIAGNOSIS — L40.9 PSORIASIS: ICD-10-CM

## 2021-05-19 DIAGNOSIS — L40.54 JIA (JUVENILE IDIOPATHIC ARTHRITIS), PSORIATIC SUBTYPE (H): ICD-10-CM

## 2021-05-19 DIAGNOSIS — Z01.818 PREOP GENERAL PHYSICAL EXAM: Primary | ICD-10-CM

## 2021-05-19 DIAGNOSIS — Z11.4 SCREENING FOR HIV (HUMAN IMMUNODEFICIENCY VIRUS): ICD-10-CM

## 2021-05-19 DIAGNOSIS — Z12.4 SCREENING FOR MALIGNANT NEOPLASM OF CERVIX: ICD-10-CM

## 2021-05-19 PROCEDURE — 99213 OFFICE O/P EST LOW 20 MIN: CPT | Performed by: FAMILY MEDICINE

## 2021-05-19 RX ORDER — SODIUM, POTASSIUM,MAG SULFATES 17.5-3.13G
2 SOLUTION, RECONSTITUTED, ORAL ORAL SEE ADMIN INSTRUCTIONS
Qty: 354 ML | Refills: 0 | Status: SHIPPED | OUTPATIENT
Start: 2021-05-19 | End: 2021-05-25 | Stop reason: HOSPADM

## 2021-05-19 RX ORDER — BISACODYL 5 MG/1
15 TABLET, DELAYED RELEASE ORAL SEE ADMIN INSTRUCTIONS
Qty: 3 TABLET | Refills: 0 | Status: SHIPPED | OUTPATIENT
Start: 2021-05-19 | End: 2021-05-25 | Stop reason: HOSPADM

## 2021-05-19 ASSESSMENT — PAIN SCALES - GENERAL: PAINLEVEL: NO PAIN (0)

## 2021-05-19 NOTE — H&P (VIEW-ONLY)
[address]  [facility] [address]  Phone: [phone]  Primary Provider: Addie Cochran  Pre-op Performing Provider: ADDIE GRAJEDA      PREOPERATIVE EVALUATION:  Today's date: 5/19/2021    Deedee Tyson is a 20 year old female who presents for a preoperative evaluation.    Surgical Information:  Surgery/Procedure: Colonoscopy with CO2 insufflation  And Esophagogastroduodenoscopy  Surgery Location:  OR  Surgeon: Dr. Hernandez  Surgery Date: 5/26/21  Time of Surgery: 1:00 pm  Where patient plans to recover: At home with family  Fax number for surgical facility: Note does not need to be faxed, will be available electronically in Epic.    Type of Anesthesia Anticipated: Monitor Anesthesia Care    Assessment & Plan     The proposed surgical procedure is considered LOW risk.      ICD-10-CM    1. Preop general physical exam  Z01.818    2. ANAY (juvenile idiopathic arthritis), psoriatic subtype  L40.54    3. Psoriasis  L40.9    4. Screening for HIV (human immunodeficiency virus)  Z11.4    5. Screening for malignant neoplasm of cervix  Z12.4 Pap imaged thin layer screen only - recommended age 21 - 24 years        Discussed with patient that she is due for a Pap and STD screening though is not necessary for preop today.  She is planning to get  and move, so encouraged her to establish care in [place] where she will be if possible otherwise we will take care of these things as well as encouraged her to follow-up with Dr. Dukes with rheumatology if she needs to do care here before she transfers.  Discussed Covid vaccinations and the risks for each vaccination twice and due to the convenience of the nature of the Covid vaccination she is planning Covid vaccination with [employer] and discussed its clotting risks which are small but real.  She is aware to wait until after her EGD is completed to complete this       Risks and Recommendations:  The patient has  the following additional risks and recommendations for perioperative complications:   - No identified additional risk factors other than previously addressed    Medication Instructions:  Patient is on no chronic medications    RECOMMENDATION:  APPROVAL GIVEN to proceed with proposed procedure, without further diagnostic evaluation.        22 minutes spent on the date of the encounter doing chart review, history and exam, documentation and further activities per the note        Subjective     HPI related to upcoming procedure: egd, bloody diarrhea    Preop Questions 5/18/2021   1. Have you ever had a heart attack or stroke? No   2. Have you ever had surgery on your heart or blood vessels, such as a stent placement, a coronary artery bypass, or surgery on an artery in your head, neck, heart, or legs? No   3. Do you have chest pain with activity? No   4. Do you have a history of  heart failure? No   5. Do you currently have a cold, bronchitis or symptoms of other infection? No   6. Do you have a cough, shortness of breath, or wheezing? No   7. Do you or anyone in your family have previous history of blood clots? UNKNOWN - adopted   8. Do you or does anyone in your family have a serious bleeding problem such as prolonged bleeding following surgeries or cuts? UNKNOWN - adopted   9. Have you ever had problems with anemia or been told to take iron pills? No   10. Have you had any abnormal blood loss such as black, tarry or bloody stools, or abnormal vaginal bleeding? No   11. Have you ever had a blood transfusion? No   12. Are you willing to have a blood transfusion if it is medically needed before, during, or after your surgery? Yes   13. Have you or any of your relatives ever had problems with anesthesia? UNKNOWN - adopted   14. Do you have sleep apnea, excessive snoring or daytime drowsiness? No   15. Do you have any artifical heart valves or other implanted medical devices like a pacemaker, defibrillator, or continuous  glucose monitor? No   16. Do you have artificial joints? No   17. Are you allergic to latex? No   18. Is there any chance that you may be pregnant? No       Health Care Directive:  Patient does not have a Health Care Directive or Living Will: Discussed advance care planning with patient; information given to patient to review.    Preoperative Review of :   reviewed - no record of controlled substances prescribed.      Status of Chronic Conditions:  ANAY - not on meds    Review of Systems   Mild congestion - chronic allergies, 2 weeks without diarrhea, but small bit of blood with wiping and sometimes lower L ab pain.  Constitutional, neuro, ENT, endocrine, pulmonary, cardiac, gastrointestinal, genitourinary, musculoskeletal, integument and psychiatric systems are negative, except as otherwise noted.    Patient Active Problem List    Diagnosis Date Noted     NSAID long-term use 07/25/2019     Priority: Medium     Wears glasses 01/06/2017     Priority: Medium     Methotrexate, long term, current use 12/28/2015     Priority: Medium     For ANAY.  Is NOT particularly immunosuppressant.         ANAY (juvenile idiopathic arthritis), psoriatic subtype 06/10/2015     Priority: Medium     First peds rheum consult 6/10/2015: right elbow arthritis, left knee arthritis, finger/toe stiffness, enthesitis, SI tenderness. Also had Strep (RST positive; treated, decreased ASO/AntiDNase B). Started on meloxicam. Improved exam on 7/30/2015.  Started SQ methotrexate 11/12/2015 due to ?SI arthritis on exam and finger arthritis.  MRI SI/Coccyx without contrast done at [facility] on 11/30/2015: Normal SI joints, mild edematous marrow signal changes of the sacrococcygeal joint and a mobile distal intercoccygeal joint suggestive of ongoing inflammation.  12/28/2015 ~ CROM. 12/19/2016 when not really on meloxicam, had some flare of inflammation.  3/30/2017 on more meloxicam, all normal but 2 fingers stiff       Psoriasis 04/26/2015     Priority:  Medium     Guttate and scalp psoriasis.  Dx 2-3/2015.          Past Medical History:   Diagnosis Date     Adopted     [place] at 16 months     Seasonal allergies      Past Surgical History:   Procedure Laterality Date     TONSILLECTOMY N/A 12/26/2017    Procedure: TONSILLECTOMY;  TONSILLECTOMY;  Surgeon: Alexis Mckinnon MD;  Location: [facility] OR     wisdom teeth       Current Outpatient Medications   Medication Sig Dispense Refill     meloxicam (MOBIC) 7.5 MG tablet Take 1.5 tabs by mouth daily with food. (Patient not taking: Reported on 3/5/2021) 135 tablet 1     methotrexate 2.5 MG tablet Take 6 tablets (15 mg) by mouth once a week (Patient not taking: Reported on 3/5/2021) 72 tablet 1       No Known Allergies     Social History     Tobacco Use     Smoking status: Never Smoker     Smokeless tobacco: Never Used     Tobacco comment: no smokers in the house   Substance Use Topics     Alcohol use: No     Alcohol/week: 0.0 standard drinks       History   Drug Use No         Objective     BP 92/66   Pulse 83   Temp 97.8  F (36.6  C)   Resp 16   Wt 58.5 kg (129 lb)   SpO2 98%   BMI 23.59 kg/m      Physical Exam    GENERAL APPEARANCE: healthy, alert and no distress     EYES: EOMI, PERRL     HENT: ear canals and TM's normal and nose and mouth without ulcers or lesions     NECK: no adenopathy, no asymmetry, masses, or scars and thyroid normal to palpation     RESP: lungs clear to auscultation - no rales, rhonchi or wheezes     CV: regular rates and rhythm, normal S1 S2, no S3 or S4 and no murmur, click or rub     ABDOMEN:  soft, nontender, no HSM or masses and bowel sounds normal     MS: extremities normal- no gross deformities noted, no evidence of inflammation in joints, FROM in all extremities.     SKIN: no suspicious lesions or rashes     NEURO: Normal strength and tone, sensory exam grossly normal, mentation intact and speech normal     PSYCH: mentation appears normal. and affect normal/bright     LYMPHATICS: No  cervical adenopathy    Recent Labs   Lab Test 03/05/21  0955 03/19/20  1343   HGB 13.7 13.4    271     --    POTASSIUM 4.1  --    CR 0.73 0.72        Diagnostics:     No EKG required for low risk surgery (cataract, skin procedure, breast biopsy, etc).    Revised Cardiac Risk Index (RCRI):  The patient has the following serious cardiovascular risks for perioperative complications:   - No serious cardiac risks = 0 points     RCRI Interpretation: 0 points: Class I (very low risk - 0.4% complication rate)           Signed Electronically by: Addie Chang MD, MD  Copy of this evaluation report is provided to requesting physician.

## 2021-05-19 NOTE — PROGRESS NOTES
[address]  [facility] [address]  Phone: [phone]  Primary Provider: Addie Cochran  Pre-op Performing Provider: ADDIE GRAJEDA      PREOPERATIVE EVALUATION:  Today's date: 5/19/2021    Deedee Tyson is a 20 year old female who presents for a preoperative evaluation.    Surgical Information:  Surgery/Procedure: Colonoscopy with CO2 insufflation  And Esophagogastroduodenoscopy  Surgery Location:  OR  Surgeon: Dr. Hernandez  Surgery Date: 5/26/21  Time of Surgery: 1:00 pm  Where patient plans to recover: At home with family  Fax number for surgical facility: Note does not need to be faxed, will be available electronically in Epic.    Type of Anesthesia Anticipated: Monitor Anesthesia Care    Assessment & Plan     The proposed surgical procedure is considered LOW risk.      ICD-10-CM    1. Preop general physical exam  Z01.818    2. ANAY (juvenile idiopathic arthritis), psoriatic subtype  L40.54    3. Psoriasis  L40.9    4. Screening for HIV (human immunodeficiency virus)  Z11.4    5. Screening for malignant neoplasm of cervix  Z12.4 Pap imaged thin layer screen only - recommended age 21 - 24 years        Discussed with patient that she is due for a Pap and STD screening though is not necessary for preop today.  She is planning to get  and move, so encouraged her to establish care in [place] where she will be if possible otherwise we will take care of these things as well as encouraged her to follow-up with Dr. Dukes with rheumatology if she needs to do care here before she transfers.  Discussed Covid vaccinations and the risks for each vaccination twice and due to the convenience of the nature of the Covid vaccination she is planning Covid vaccination with [employer] and discussed its clotting risks which are small but real.  She is aware to wait until after her EGD is completed to complete this       Risks and Recommendations:  The patient has  the following additional risks and recommendations for perioperative complications:   - No identified additional risk factors other than previously addressed    Medication Instructions:  Patient is on no chronic medications    RECOMMENDATION:  APPROVAL GIVEN to proceed with proposed procedure, without further diagnostic evaluation.        22 minutes spent on the date of the encounter doing chart review, history and exam, documentation and further activities per the note        Subjective     HPI related to upcoming procedure: egd, bloody diarrhea    Preop Questions 5/18/2021   1. Have you ever had a heart attack or stroke? No   2. Have you ever had surgery on your heart or blood vessels, such as a stent placement, a coronary artery bypass, or surgery on an artery in your head, neck, heart, or legs? No   3. Do you have chest pain with activity? No   4. Do you have a history of  heart failure? No   5. Do you currently have a cold, bronchitis or symptoms of other infection? No   6. Do you have a cough, shortness of breath, or wheezing? No   7. Do you or anyone in your family have previous history of blood clots? UNKNOWN - adopted   8. Do you or does anyone in your family have a serious bleeding problem such as prolonged bleeding following surgeries or cuts? UNKNOWN - adopted   9. Have you ever had problems with anemia or been told to take iron pills? No   10. Have you had any abnormal blood loss such as black, tarry or bloody stools, or abnormal vaginal bleeding? No   11. Have you ever had a blood transfusion? No   12. Are you willing to have a blood transfusion if it is medically needed before, during, or after your surgery? Yes   13. Have you or any of your relatives ever had problems with anesthesia? UNKNOWN - adopted   14. Do you have sleep apnea, excessive snoring or daytime drowsiness? No   15. Do you have any artifical heart valves or other implanted medical devices like a pacemaker, defibrillator, or continuous  glucose monitor? No   16. Do you have artificial joints? No   17. Are you allergic to latex? No   18. Is there any chance that you may be pregnant? No       Health Care Directive:  Patient does not have a Health Care Directive or Living Will: Discussed advance care planning with patient; information given to patient to review.    Preoperative Review of :   reviewed - no record of controlled substances prescribed.      Status of Chronic Conditions:  ANAY - not on meds    Review of Systems   Mild congestion - chronic allergies, 2 weeks without diarrhea, but small bit of blood with wiping and sometimes lower L ab pain.  Constitutional, neuro, ENT, endocrine, pulmonary, cardiac, gastrointestinal, genitourinary, musculoskeletal, integument and psychiatric systems are negative, except as otherwise noted.    Patient Active Problem List    Diagnosis Date Noted     NSAID long-term use 07/25/2019     Priority: Medium     Wears glasses 01/06/2017     Priority: Medium     Methotrexate, long term, current use 12/28/2015     Priority: Medium     For ANAY.  Is NOT particularly immunosuppressant.         ANAY (juvenile idiopathic arthritis), psoriatic subtype 06/10/2015     Priority: Medium     First peds rheum consult 6/10/2015: right elbow arthritis, left knee arthritis, finger/toe stiffness, enthesitis, SI tenderness. Also had Strep (RST positive; treated, decreased ASO/AntiDNase B). Started on meloxicam. Improved exam on 7/30/2015.  Started SQ methotrexate 11/12/2015 due to ?SI arthritis on exam and finger arthritis.  MRI SI/Coccyx without contrast done at [facility] on 11/30/2015: Normal SI joints, mild edematous marrow signal changes of the sacrococcygeal joint and a mobile distal intercoccygeal joint suggestive of ongoing inflammation.  12/28/2015 ~ CROM. 12/19/2016 when not really on meloxicam, had some flare of inflammation.  3/30/2017 on more meloxicam, all normal but 2 fingers stiff       Psoriasis 04/26/2015     Priority:  Medium     Guttate and scalp psoriasis.  Dx 2-3/2015.          Past Medical History:   Diagnosis Date     Adopted     [place] at 16 months     Seasonal allergies      Past Surgical History:   Procedure Laterality Date     TONSILLECTOMY N/A 12/26/2017    Procedure: TONSILLECTOMY;  TONSILLECTOMY;  Surgeon: Alexis Mckinnon MD;  Location: [facility] OR     wisdom teeth       Current Outpatient Medications   Medication Sig Dispense Refill     meloxicam (MOBIC) 7.5 MG tablet Take 1.5 tabs by mouth daily with food. (Patient not taking: Reported on 3/5/2021) 135 tablet 1     methotrexate 2.5 MG tablet Take 6 tablets (15 mg) by mouth once a week (Patient not taking: Reported on 3/5/2021) 72 tablet 1       No Known Allergies     Social History     Tobacco Use     Smoking status: Never Smoker     Smokeless tobacco: Never Used     Tobacco comment: no smokers in the house   Substance Use Topics     Alcohol use: No     Alcohol/week: 0.0 standard drinks       History   Drug Use No         Objective     BP 92/66   Pulse 83   Temp 97.8  F (36.6  C)   Resp 16   Wt 58.5 kg (129 lb)   SpO2 98%   BMI 23.59 kg/m      Physical Exam    GENERAL APPEARANCE: healthy, alert and no distress     EYES: EOMI, PERRL     HENT: ear canals and TM's normal and nose and mouth without ulcers or lesions     NECK: no adenopathy, no asymmetry, masses, or scars and thyroid normal to palpation     RESP: lungs clear to auscultation - no rales, rhonchi or wheezes     CV: regular rates and rhythm, normal S1 S2, no S3 or S4 and no murmur, click or rub     ABDOMEN:  soft, nontender, no HSM or masses and bowel sounds normal     MS: extremities normal- no gross deformities noted, no evidence of inflammation in joints, FROM in all extremities.     SKIN: no suspicious lesions or rashes     NEURO: Normal strength and tone, sensory exam grossly normal, mentation intact and speech normal     PSYCH: mentation appears normal. and affect normal/bright     LYMPHATICS: No  cervical adenopathy    Recent Labs   Lab Test 03/05/21  0955 03/19/20  1343   HGB 13.7 13.4    271     --    POTASSIUM 4.1  --    CR 0.73 0.72        Diagnostics:     No EKG required for low risk surgery (cataract, skin procedure, breast biopsy, etc).    Revised Cardiac Risk Index (RCRI):  The patient has the following serious cardiovascular risks for perioperative complications:   - No serious cardiac risks = 0 points     RCRI Interpretation: 0 points: Class I (very low risk - 0.4% complication rate)           Signed Electronically by: Addie Chang MD, MD  Copy of this evaluation report is provided to requesting physician.

## 2021-05-23 DIAGNOSIS — Z11.59 ENCOUNTER FOR SCREENING FOR OTHER VIRAL DISEASES: ICD-10-CM

## 2021-05-23 LAB
SARS-COV-2 RNA RESP QL NAA+PROBE: NORMAL
SPECIMEN SOURCE: NORMAL

## 2021-05-23 PROCEDURE — U0005 INFEC AGEN DETEC AMPLI PROBE: HCPCS | Performed by: INTERNAL MEDICINE

## 2021-05-23 PROCEDURE — U0003 INFECTIOUS AGENT DETECTION BY NUCLEIC ACID (DNA OR RNA); SEVERE ACUTE RESPIRATORY SYNDROME CORONAVIRUS 2 (SARS-COV-2) (CORONAVIRUS DISEASE [COVID-19]), AMPLIFIED PROBE TECHNIQUE, MAKING USE OF HIGH THROUGHPUT TECHNOLOGIES AS DESCRIBED BY CMS-2020-01-R: HCPCS | Performed by: INTERNAL MEDICINE

## 2021-05-24 VITALS — WEIGHT: 128.97 LBS | BODY MASS INDEX: 23.73 KG/M2 | HEIGHT: 62 IN

## 2021-05-24 ASSESSMENT — MIFFLIN-ST. JEOR: SCORE: 1303.25

## 2021-05-26 ENCOUNTER — ANESTHESIA EVENT (OUTPATIENT)
Dept: SURGERY | Facility: AMBULATORY SURGERY CENTER | Age: 21
End: 2021-05-26
Payer: COMMERCIAL

## 2021-05-26 ENCOUNTER — HOSPITAL ENCOUNTER (OUTPATIENT)
Facility: AMBULATORY SURGERY CENTER | Age: 21
End: 2021-05-26
Attending: INTERNAL MEDICINE
Payer: COMMERCIAL

## 2021-05-26 ENCOUNTER — ANESTHESIA (OUTPATIENT)
Dept: SURGERY | Facility: AMBULATORY SURGERY CENTER | Age: 21
End: 2021-05-26
Payer: COMMERCIAL

## 2021-05-26 VITALS
HEIGHT: 62 IN | SYSTOLIC BLOOD PRESSURE: 106 MMHG | OXYGEN SATURATION: 100 % | BODY MASS INDEX: 23.19 KG/M2 | WEIGHT: 126 LBS | DIASTOLIC BLOOD PRESSURE: 68 MMHG | HEART RATE: 81 BPM | RESPIRATION RATE: 18 BRPM | TEMPERATURE: 97.6 F

## 2021-05-26 VITALS — HEART RATE: 84 BPM

## 2021-05-26 LAB
COLONOSCOPY: NORMAL
HCG UR QL: NEGATIVE
INTERNAL QC OK POCT: YES
UPPER GI ENDOSCOPY: NORMAL

## 2021-05-26 PROCEDURE — 45380 COLONOSCOPY AND BIOPSY: CPT

## 2021-05-26 PROCEDURE — 43239 EGD BIOPSY SINGLE/MULTIPLE: CPT

## 2021-05-26 PROCEDURE — 81025 URINE PREGNANCY TEST: CPT | Performed by: PATHOLOGY

## 2021-05-26 PROCEDURE — 88305 TISSUE EXAM BY PATHOLOGIST: CPT | Performed by: PATHOLOGY

## 2021-05-26 RX ORDER — NALOXONE HYDROCHLORIDE 0.4 MG/ML
0.2 INJECTION, SOLUTION INTRAMUSCULAR; INTRAVENOUS; SUBCUTANEOUS
Status: DISCONTINUED | OUTPATIENT
Start: 2021-05-26 | End: 2021-05-27 | Stop reason: HOSPADM

## 2021-05-26 RX ORDER — ONDANSETRON 4 MG/1
4 TABLET, ORALLY DISINTEGRATING ORAL EVERY 6 HOURS PRN
Status: DISCONTINUED | OUTPATIENT
Start: 2021-05-26 | End: 2021-05-27 | Stop reason: HOSPADM

## 2021-05-26 RX ORDER — ONDANSETRON 2 MG/ML
4 INJECTION INTRAMUSCULAR; INTRAVENOUS EVERY 6 HOURS PRN
Status: DISCONTINUED | OUTPATIENT
Start: 2021-05-26 | End: 2021-05-27 | Stop reason: HOSPADM

## 2021-05-26 RX ORDER — NALOXONE HYDROCHLORIDE 0.4 MG/ML
0.4 INJECTION, SOLUTION INTRAMUSCULAR; INTRAVENOUS; SUBCUTANEOUS
Status: DISCONTINUED | OUTPATIENT
Start: 2021-05-26 | End: 2021-05-27 | Stop reason: HOSPADM

## 2021-05-26 RX ORDER — FLUMAZENIL 0.1 MG/ML
0.2 INJECTION, SOLUTION INTRAVENOUS
Status: DISCONTINUED | OUTPATIENT
Start: 2021-05-26 | End: 2021-05-27 | Stop reason: HOSPADM

## 2021-05-26 RX ORDER — SODIUM CHLORIDE, SODIUM LACTATE, POTASSIUM CHLORIDE, CALCIUM CHLORIDE 600; 310; 30; 20 MG/100ML; MG/100ML; MG/100ML; MG/100ML
INJECTION, SOLUTION INTRAVENOUS CONTINUOUS PRN
Status: DISCONTINUED | OUTPATIENT
Start: 2021-05-26 | End: 2021-05-26

## 2021-05-26 RX ORDER — LIDOCAINE HYDROCHLORIDE 20 MG/ML
INJECTION, SOLUTION INFILTRATION; PERINEURAL PRN
Status: DISCONTINUED | OUTPATIENT
Start: 2021-05-26 | End: 2021-05-26

## 2021-05-26 RX ORDER — PROPOFOL 10 MG/ML
INJECTION, EMULSION INTRAVENOUS PRN
Status: DISCONTINUED | OUTPATIENT
Start: 2021-05-26 | End: 2021-05-26

## 2021-05-26 RX ORDER — ONDANSETRON 2 MG/ML
4 INJECTION INTRAMUSCULAR; INTRAVENOUS
Status: DISCONTINUED | OUTPATIENT
Start: 2021-05-26 | End: 2021-05-26 | Stop reason: HOSPADM

## 2021-05-26 RX ORDER — ONDANSETRON 2 MG/ML
INJECTION INTRAMUSCULAR; INTRAVENOUS PRN
Status: DISCONTINUED | OUTPATIENT
Start: 2021-05-26 | End: 2021-05-26

## 2021-05-26 RX ORDER — LIDOCAINE 40 MG/G
CREAM TOPICAL
Status: DISCONTINUED | OUTPATIENT
Start: 2021-05-26 | End: 2021-05-26 | Stop reason: HOSPADM

## 2021-05-26 RX ORDER — PROCHLORPERAZINE MALEATE 10 MG
10 TABLET ORAL EVERY 6 HOURS PRN
Status: DISCONTINUED | OUTPATIENT
Start: 2021-05-26 | End: 2021-05-27 | Stop reason: HOSPADM

## 2021-05-26 RX ORDER — GLYCOPYRROLATE 0.2 MG/ML
INJECTION, SOLUTION INTRAMUSCULAR; INTRAVENOUS PRN
Status: DISCONTINUED | OUTPATIENT
Start: 2021-05-26 | End: 2021-05-26

## 2021-05-26 RX ORDER — PROPOFOL 10 MG/ML
INJECTION, EMULSION INTRAVENOUS CONTINUOUS PRN
Status: DISCONTINUED | OUTPATIENT
Start: 2021-05-26 | End: 2021-05-26

## 2021-05-26 RX ADMIN — ONDANSETRON 4 MG: 2 INJECTION INTRAMUSCULAR; INTRAVENOUS at 13:39

## 2021-05-26 RX ADMIN — LIDOCAINE HYDROCHLORIDE 50 MG: 20 INJECTION, SOLUTION INFILTRATION; PERINEURAL at 13:39

## 2021-05-26 RX ADMIN — PROPOFOL 200 MCG/KG/MIN: 10 INJECTION, EMULSION INTRAVENOUS at 13:42

## 2021-05-26 RX ADMIN — SODIUM CHLORIDE, SODIUM LACTATE, POTASSIUM CHLORIDE, CALCIUM CHLORIDE: 600; 310; 30; 20 INJECTION, SOLUTION INTRAVENOUS at 13:12

## 2021-05-26 RX ADMIN — GLYCOPYRROLATE 0.2 MG: 0.2 INJECTION, SOLUTION INTRAMUSCULAR; INTRAVENOUS at 13:39

## 2021-05-26 RX ADMIN — PROPOFOL 60 MG: 10 INJECTION, EMULSION INTRAVENOUS at 13:42

## 2021-05-26 ASSESSMENT — MIFFLIN-ST. JEOR: SCORE: 1289.78

## 2021-05-26 NOTE — ANESTHESIA PREPROCEDURE EVALUATION
Anesthesia Pre-Procedure Evaluation    Patient: Deedee ESCOBEDO Suiter   MRN: [id number] : 2000        Preoperative Diagnosis: Blood in stool [K92.1]  Irregular bowel habits [R19.8]   Procedure : Procedure(s):  COLONOSCOPY, WITH CO2 INSUFFLATION  ESOPHAGOGASTRODUODENOSCOPY, WITH CO2 INSUFFLATION     Past Medical History:   Diagnosis Date     Adopted     [place] at 16 months     Juvenile psoriatic arthritis (H)      Seasonal allergies       Past Surgical History:   Procedure Laterality Date     TONSILLECTOMY N/A 2017    Procedure: TONSILLECTOMY;  TONSILLECTOMY;  Surgeon: Alexis Mckinnon MD;  Location: [facility] OR     wisdom teeth        No Known Allergies   Social History     Tobacco Use     Smoking status: Never Smoker     Smokeless tobacco: Never Used     Tobacco comment: no smokers in the house   Substance Use Topics     Alcohol use: No     Alcohol/week: 0.0 standard drinks      Wt Readings from Last 1 Encounters:   21 57.2 kg (126 lb)        Anesthesia Evaluation   Pt has had prior anesthetic.     No history of anesthetic complications       ROS/MED HX  ENT/Pulmonary:     (+) allergic rhinitis,     Neurologic:       Cardiovascular:       METS/Exercise Tolerance:     Hematologic:       Musculoskeletal:   (+) arthritis,     GI/Hepatic:       Renal/Genitourinary:       Endo:       Psychiatric/Substance Use:       Infectious Disease:       Malignancy:       Other:            Physical Exam    Airway        Mallampati: I   TM distance: > 3 FB   Neck ROM: full   Mouth opening: > 3 cm    Respiratory Devices and Support         Dental  no notable dental history         Cardiovascular             Pulmonary                   OUTSIDE LABS:  CBC:   Lab Results   Component Value Date    WBC 5.1 2021    WBC 7.3 2020    HGB 13.7 2021    HGB 13.4 2020    HCT 41.9 2021    HCT 41.4 2020     2021     2020     BMP:   Lab Results   Component Value Date      03/05/2021    POTASSIUM 4.1 03/05/2021    CHLORIDE 108 03/05/2021    CO2 27 03/05/2021    BUN 11 03/05/2021    CR 0.73 03/05/2021    CR 0.72 03/19/2020    GLC 84 03/05/2021     (H) 06/18/2018     COAGS: No results found for: PTT, INR, FIBR  POC:   Lab Results   Component Value Date    HCG Negative 05/26/2021     HEPATIC:   Lab Results   Component Value Date    ALBUMIN 4.1 03/05/2021    PROTTOTAL 7.5 03/05/2021    ALT 22 03/05/2021    AST 20 03/05/2021    ALKPHOS 49 03/05/2021    BILITOTAL 0.4 03/05/2021     OTHER:   Lab Results   Component Value Date    CAREN 9.3 03/05/2021    TSH 1.19 01/03/2019    T4 0.97 01/03/2019    CRP <2.9 07/25/2019    SED 6 07/25/2019       Anesthesia Plan    ASA Status:  2   NPO Status:  NPO Appropriate    Anesthesia Type: MAC.     - Reason for MAC: straight local not clinically adequate   Induction: Intravenous, Propofol.   Maintenance: TIVA.        Consents    Anesthesia Plan(s) and associated risks, benefits, and realistic alternatives discussed. Questions answered and patient/representative(s) expressed understanding.     - Discussed with:  Patient         Postoperative Care       PONV prophylaxis: Background Propofol Infusion, Ondansetron (or other 5HT-3)     Comments:                Varun Myers MD

## 2021-05-26 NOTE — ANESTHESIA CARE TRANSFER NOTE
Patient: Deedee ESCOBEDO Suiter    Procedure(s):  COLONOSCOPY, WITH BIOPSY  ESOPHAGOGASTRODUODENOSCOPY, WITH CO2 INSUFFLATION AND BIOPSY    Diagnosis: Blood in stool [K92.1]  Irregular bowel habits [R19.8]  Diagnosis Additional Information: No value filed.    Anesthesia Type:   No value filed.     Note:    Oropharynx: spontaneously breathing  Level of Consciousness: awake  Oxygen Supplementation: room air    Independent Airway: airway patency satisfactory and stable  Dentition: dentition unchanged  Vital Signs Stable: post-procedure vital signs reviewed and stable  Report to RN Given: handoff report given  Patient transferred to: Phase II    Handoff Report: Identifed the Patient, Identified the Reponsible Provider, Reviewed the pertinent medical history, Discussed the surgical course, Reviewed Intra-OP anesthesia mangement and issues during anesthesia, Set expectations for post-procedure period and Allowed opportunity for questions and acknowledgement of understanding      Vitals: (Last set prior to Anesthesia Care Transfer)  CRNA VITALS  5/26/2021 1356 - 5/26/2021 1427      5/26/2021             Pulse:  77    SpO2:  91 %        Electronically Signed By: CUONG Gardner CRNA  May 26, 2021  2:27 PM

## 2021-05-26 NOTE — ANESTHESIA POSTPROCEDURE EVALUATION
Patient: Deedee ESCOBEDO Suiter    Procedure(s):  COLONOSCOPY, WITH BIOPSY  ESOPHAGOGASTRODUODENOSCOPY, WITH CO2 INSUFFLATION AND BIOPSY    Diagnosis:Blood in stool [K92.1]  Irregular bowel habits [R19.8]  Diagnosis Additional Information: No value filed.    Anesthesia Type:  No value filed.    Note:  Disposition: Outpatient   Postop Pain Control: Uneventful            Sign Out: Well controlled pain   PONV: No   Neuro/Psych: Uneventful            Sign Out: Acceptable/Baseline neuro status   Airway/Respiratory: Uneventful            Sign Out: Acceptable/Baseline resp. status   CV/Hemodynamics: Uneventful            Sign Out: Acceptable CV status   Other NRE: NONE   DID A NON-ROUTINE EVENT OCCUR? No           Last vitals:  Vitals:    05/26/21 1428 05/26/21 1443 05/26/21 1458   BP: 97/59 106/69 106/68   Pulse:      Resp: 12 16 18   Temp: 36.1  C (96.9  F)  36.4  C (97.6  F)   SpO2: 99% 100% 100%       Last vitals prior to Anesthesia Care Transfer:  CRNA VITALS  5/26/2021 1356 - 5/26/2021 1456      5/26/2021             Pulse:  77    SpO2:  91 %          Electronically Signed By: Josue Cavazos MD  May 26, 2021  3:48 PM

## 2021-05-27 LAB — COPATH REPORT: NORMAL

## 2021-07-31 ENCOUNTER — TELEPHONE (OUTPATIENT)
Dept: GASTROENTEROLOGY | Facility: CLINIC | Age: 21
End: 2021-07-31

## 2021-08-24 ENCOUNTER — VIRTUAL VISIT (OUTPATIENT)
Dept: GASTROENTEROLOGY | Facility: CLINIC | Age: 21
End: 2021-08-24
Payer: COMMERCIAL

## 2021-08-24 VITALS — WEIGHT: 130 LBS | BODY MASS INDEX: 23.92 KG/M2 | HEIGHT: 62 IN

## 2021-08-24 DIAGNOSIS — A04.8 H. PYLORI INFECTION: ICD-10-CM

## 2021-08-24 DIAGNOSIS — R19.8 IRREGULAR BOWEL HABITS: Primary | ICD-10-CM

## 2021-08-24 PROCEDURE — 99213 OFFICE O/P EST LOW 20 MIN: CPT | Mod: 95 | Performed by: PHYSICIAN ASSISTANT

## 2021-08-24 ASSESSMENT — MIFFLIN-ST. JEOR: SCORE: 1307.93

## 2021-08-24 NOTE — PATIENT INSTRUCTIONS
It was a pleasure taking care of you today.  I've included a brief summary of our discussion and care plan from today's visit below.  Please review this information with your primary care provider.  _______________________________________________________________________    My recommendations are summarized as follows:    -- Plan to treat the H pylori once your semester is done. Send a message via dscovered when you'd like to start it! If you'd prefer to have a visit, that is also fine.  -- If the heartburn symptoms are bothersome, you can use over-the-counter Tums, Gaviscon, and/or Pepcid as needed.  -- Continue high fiber diet, with goal at least 25-30 grams per day. Fiber supplements are a nice way to help meet this goal. When used on a daily basis, this can help regulate the consistency of your stools. Metamucil and Citrucel are preferred examples. You can start with 1-2 teaspoos per day, with goal to gradually increase to 1 tablespoon daily. It is important to stay well-hydrated with use of fiber supplementation and to make sure that the fiber powder is well mixed with water as directed on the label.       Return to GI Clinic in 6 months to review your progress.    ______________________________________________________________________    How do I schedule labs, imaging studies, or procedures that were ordered in clinic today?     Labs: To schedule lab appointment at the [facility], use my chart or call [phone]. If you have a [facility] lab closer to home where you are regularly seen you can give them a call.     Procedures: If a colonoscopy, upper endoscopy, breath test, esophageal manometry, or pH impedence was ordered today, our endoscopy team will call you to schedule this. If you have not heard from our endoscopy team within a week, please call [phone] to schedule.     Imaging Studies: If you were scheduled for a CT scan, X-ray, MRI, ultrasound, HIDA scan or other imaging study, please  call [phone] to have this scheduled.     Referral: If a referral to another specialty was ordered, expect a phone call or follow instructions above. If you have not heard from anyone regarding your referral in a week, please call our clinic to check the status.     Who do I call with any questions after my visit?  Please be in touch if there are any further questions that arise following today's visit.  There are multiple ways to contact your gastroenterology care team.        During business hours, you may reach a Gastroenterology nurse at [phone]      To schedule or reschedule an appointment, please call [phone].       You can always send a secure message through GuideWall.  GuideWall messages are answered by your nurse or doctor typically within 24 hours.  Please allow extra time on weekends and holidays.        For urgent/emergent questions after business hours, you may reach the on-call GI Fellow by contacting the [facility]  at [phone].     How will I get the results of any tests ordered?    You will receive all of your results.  If you have signed up for [email or web address] Applicationt, any tests ordered at your visit will be available to you after your physician reviews them.  Typically this takes 1-2 weeks.  If there are urgent results that require a change in your care plan, your physician or nurse will call you to discuss the next steps.      What is GuideWall?  GuideWall is a secure way for you to access all of your healthcare records from the [facility].  It is a web based computer program, so you can sign on to it from any location.  It also allows you to send secure messages to your care team.  I recommend signing up for GuideWall access if you have not already done so and are comfortable with using a computer.      How to I schedule a follow-up visit?  If you did not schedule a follow-up visit today, please call [phone] to schedule a follow-up office visit.       Sincerely,    Elizabeth Toney PA-C  Division of Gastroenterology, Hepatology & Nutrition  [facility]

## 2021-08-24 NOTE — PROGRESS NOTES
GI CLINIC VISIT    CC/REFERRING PROVIDER: Dina Bundy  REASON FOR CONSULTATION: bloody diarrhea    HPI: 20 year old adopted female with PMH of psoriasis with psoriatic arthritis presenting to GI clinic for blood in stools.    Deedee presented with intermittent bright red blood with wiping and streaking the stool. Reported baseline bowel pattern of stooling every 1-2 days, with soft stools and occasional straining, associated with discomfort in the anal and abdominal areas following bowel movements. CBC, CMP unremarkable. Enteric panel, O and P negative.     EGD/colonoscopy was performed 05/26/2021. There was some mild erythema and granularity at the level of the IC valve, with biopsies unremarkable for any chronic process. Non-bleeding internal hemorrhjoids were present and most likely source of rectal bleeding. EGD endoscopically unremarkable, however gastric biopsies revealed active chronic gastritis, positive for H pylori.    Since last visit, Deedee is feeling a lot better. She has started to increase dietary fiber and started a probiotic, which she has felt has improved the bowel movements and discomfort. She has occasional heartburn symptoms that tend to follow intake of acidic foods or heavier meals, she does not feel it is at a point where she is inclined to take anything for it. She just started her semester and prefers to defer antibiotic treatment for H pylori until end of semester.    Adopted - no known family history    ROS: 10pt ROS performed and otherwise negative.    PAST MEDICAL HISTORY:  Past Medical History:   Diagnosis Date     Adopted     [place] at 16 months     Juvenile psoriatic arthritis (H)      Seasonal allergies        PREVIOUS ABDOMINAL/GYNECOLOGIC SURGERIES:    Past Surgical History:   Procedure Laterality Date     COLONOSCOPY N/A 5/26/2021    Procedure: COLONOSCOPY, WITH BIOPSY;  Surgeon: Elvis Hernandez MD;  Location: [facility] OR     COMBINED ESOPHAGOSCOPY, GASTROSCOPY,  "DUODENOSCOPY (EGD) WITH CO2 INSUFFLATION N/A 5/26/2021    Procedure: ESOPHAGOGASTRODUODENOSCOPY, WITH CO2 INSUFFLATION AND BIOPSY;  Surgeon: Elvis Hernandez MD;  Location: [facility] OR     TONSILLECTOMY N/A 12/26/2017    Procedure: TONSILLECTOMY;  TONSILLECTOMY;  Surgeon: Alexis Mckinnon MD;  Location: [facility] OR     wisdom teeth           PERTINENT MEDICATIONS:  Current Outpatient Medications   Medication Sig Dispense Refill     meloxicam (MOBIC) 7.5 MG tablet Take 1.5 tabs by mouth daily with food. (Patient not taking: Reported on 3/5/2021) 135 tablet 1     methotrexate 2.5 MG tablet Take 6 tablets (15 mg) by mouth once a week (Patient not taking: Reported on 3/5/2021) 72 tablet 1       SOCIAL HISTORY:  Smoking: no  EtOH: one drink per week    Social History     Socioeconomic History     Marital status: Single     Spouse name: Not on file     Number of children: Not on file     Years of education: Not on file     Highest education level: Not on file   Occupational History     Not on file   Tobacco Use     Smoking status: Never Smoker     Smokeless tobacco: Never Used     Tobacco comment: no smokers in the house   Substance and Sexual Activity     Alcohol use: No     Alcohol/week: 0.0 standard drinks     Drug use: No     Sexual activity: Never   Other Topics Concern     Not on file   Social History Narrative    (6/2015) Lives in [place], [place] with mother (non-biologic) and 3 brothers and 2 sisters.  3 other grown children are now out of the house.  She has 1 niece, 3 nephews and another niece/nephew on the way.  Mom and dad recently going through separation.  Stress of this really hist about February 2015.  Family has a dog.  Deedee also has a horse named \"Louie\" she loves.  She does trail riding.  Also likes baking/cooking; being outside.  Just finished 10th grade at [employer].  Mom (Dara) is retired.  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          Up date (6/2018) Deedee " finished freshman year at [employer]; plans to pursue Biochem major and perhaps graduate in 3 years.  Working at [employer].       Social Determinants of Health     Financial Resource Strain:      Difficulty of Paying Living Expenses:    Food Insecurity:      Worried About Running Out of Food in the Last Year:      Ran Out of Food in the Last Year:    Transportation Needs:      Lack of Transportation (Medical):      Lack of Transportation (Non-Medical):    Physical Activity:      Days of Exercise per Week:      Minutes of Exercise per Session:    Stress:      Feeling of Stress :    Social Connections:      Frequency of Communication with Friends and Family:      Frequency of Social Gatherings with Friends and Family:      Attends [employer] Services:      Active Member of Clubs or Organizations:      Attends Club or Organization Meetings:      Marital Status:    Intimate Partner Violence:      Fear of Current or Ex-Partner:      Emotionally Abused:      Physically Abused:      Sexually Abused:        FAMILY HISTORY:  Family History   Adopted: Yes   Problem Relation Age of Onset     Unknown/Adopted Mother      Unknown/Adopted Father        PHYSICAL EXAMINATION:  Vitals reviewed  There were no vitals taken for this visit.  Video physical exam  General: Patient appears well in no acute distress.   Skin: No visualized rash or lesions on visualized skin  Eyes: EOMI, no erythema, sclera icterus or discharge noted  Resp: Appears to be breathing comfortably without accessory muscle usage, speaking in full sentences, no cough  MSK: Appears to have normal range of motion based on visualized movements  Neurologic: No apparent tremors, facial movements symmetric  Psych: affect normal, alert and oriented    The rest of a comprehensive physical examination is deferred due to PHE (public health emergency) video restrictions      PERTINENT STUDIES Reviewed in EMR    ASSESSMENT/PLAN:  20 year old adopted female with PMH of psoriasis with  psoriatic arthritis presenting to GI clinic for blood in stools.    # Irregular bowel habits  # BRBPR  # Abdominal discomfort  Several year history of intermittent blood in stool with recent worsening, accompanied by intermittent loose stools in the background of bowel pattern consistent with constipation. Work-up negative for COVID-19, enteric pathogens was negative. CBC, CMP unremarkable. Colonoscopy revealing for internal hemorrhoids. Symptoms have since resolved with increased dietary fiber and probiotic.    -- Continued adherence to high fiber diet, minimum 25-30 grams per day  -- Add in soluble fiber supplement if needed    # H pylori  Incidental finding of H pylori on gastric biopsies, which also showed chronic inflammation. She has occasional heartburn symptoms, otherwise asymptomatic. Plan to start treatment end of December following completion of current semester, per her preference. Planning quad therapy with confirmation of eradication 2-4 weeks after completion. She will reach out in December when she is ready to take the medications.      RTC 6 months    Thank you for this consultation. It was a pleasure to participate in the care of this patient; please contact us with any further questions.    Elizabeth Toney PA-C    23 minutes spent on the date of the encounter doing chart review, review of test results, patient visit and documentation

## 2021-08-24 NOTE — LETTER
8/24/2021         RE: Deedee Tyson  [address]  [facility] [address]        Dear Colleague,    Thank you for referring your patient, Deedee Tyson, to the [facility] [place]. Please see a copy of my visit note below.    GI CLINIC VISIT    CC/REFERRING PROVIDER: Dina Bundy  REASON FOR CONSULTATION: bloody diarrhea    HPI: 20 year old adopted female with PMH of psoriasis with psoriatic arthritis presenting to GI clinic for blood in stools.    Deedee presented with intermittent bright red blood with wiping and streaking the stool. Reported baseline bowel pattern of stooling every 1-2 days, with soft stools and occasional straining, associated with discomfort in the anal and abdominal areas following bowel movements. CBC, CMP unremarkable. Enteric panel, O and P negative.     EGD/colonoscopy was performed 05/26/2021. There was some mild erythema and granularity at the level of the IC valve, with biopsies unremarkable for any chronic process. Non-bleeding internal hemorrhjoids were present and most likely source of rectal bleeding. EGD endoscopically unremarkable, however gastric biopsies revealed active chronic gastritis, positive for H pylori.    Since last visit, Deedee is feeling a lot better. She has started to increase dietary fiber and started a probiotic, which she has felt has improved the bowel movements and discomfort. She has occasional heartburn symptoms that tend to follow intake of acidic foods or heavier meals, she does not feel it is at a point where she is inclined to take anything for it. She just started her semester and prefers to defer antibiotic treatment for H pylori until end of semester.    Adopted - no known family history    ROS: 10pt ROS performed and otherwise negative.    PAST MEDICAL HISTORY:  Past Medical History:   Diagnosis Date     Adopted     [place] at 16 months     Juvenile psoriatic arthritis (H)      Seasonal allergies         PREVIOUS ABDOMINAL/GYNECOLOGIC SURGERIES:    Past Surgical History:   Procedure Laterality Date     COLONOSCOPY N/A 5/26/2021    Procedure: COLONOSCOPY, WITH BIOPSY;  Surgeon: Elvis Hernandez MD;  [place] [facility] [place]     COMBINED ESOPHAGOSCOPY, GASTROSCOPY, DUODENOSCOPY (EGD) WITH CO2 INSUFFLATION N/A 5/26/2021    Procedure: ESOPHAGOGASTRODUODENOSCOPY, WITH CO2 INSUFFLATION AND BIOPSY;  Surgeon: Elvis Hernandez MD;  [place] [facility] [place]     TONSILLECTOMY N/A 12/26/2017    Procedure: TONSILLECTOMY;  TONSILLECTOMY;  Surgeon: Alexis Mckinnon MD;  Location: [facility] OR     wisdom teeth           PERTINENT MEDICATIONS:  Current Outpatient Medications   Medication Sig Dispense Refill     meloxicam (MOBIC) 7.5 MG tablet Take 1.5 tabs by mouth daily with food. (Patient not taking: Reported on 3/5/2021) 135 tablet 1     methotrexate 2.5 MG tablet Take 6 tablets (15 mg) by mouth once a week (Patient not taking: Reported on 3/5/2021) 72 tablet 1       SOCIAL HISTORY:  Smoking: no  EtOH: one drink per week    Social History     Socioeconomic History     Marital status: Single     Spouse name: Not on file     Number of children: Not on file     Years of education: Not on file     Highest education level: Not on file   Occupational History     Not on file   Tobacco Use     Smoking status: Never Smoker     Smokeless tobacco: Never Used     Tobacco comment: no smokers in the house   Substance and Sexual Activity     Alcohol use: No     Alcohol/week: 0.0 standard drinks     Drug use: No     Sexual activity: Never   Other Topics Concern     Not on file   Social History Narrative    (6/2015) Lives in [place], [place] with mother (non-biologic) and 3 brothers and 2 sisters.  3 other grown children are now out of the house.  She has 1 niece, 3 nephews and another niece/nephew on the way.  Mom and dad recently going through separation.  Stress of this really hist about February 2015.  Family has a dog.  Deedee also has a  "horse named \"Louie\" she loves.  She does trail riding.  Also likes baking/cooking; being outside.  Just finished 10th grade at [employer].  Mom (Dara) is retired.  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          Up date (6/2018) Deedee finished freshman year at [employer]; plans to pursue Biochem major and perhaps graduate in 3 years.  Working at [employer].       Social Determinants of Health     Financial Resource Strain:      Difficulty of Paying Living Expenses:    Food Insecurity:      Worried About Running Out of Food in the Last Year:      Ran Out of Food in the Last Year:    Transportation Needs:      Lack of Transportation (Medical):      Lack of Transportation (Non-Medical):    Physical Activity:      Days of Exercise per Week:      Minutes of Exercise per Session:    Stress:      Feeling of Stress :    Social Connections:      Frequency of Communication with Friends and Family:      Frequency of Social Gatherings with Friends and Family:      Attends Muslim Services:      Active Member of Clubs or Organizations:      Attends Club or Organization Meetings:      Marital Status:    Intimate Partner Violence:      Fear of Current or Ex-Partner:      Emotionally Abused:      Physically Abused:      Sexually Abused:        FAMILY HISTORY:  Family History   Adopted: Yes   Problem Relation Age of Onset     Unknown/Adopted Mother      Unknown/Adopted Father        PHYSICAL EXAMINATION:  Vitals reviewed  There were no vitals taken for this visit.  Video physical exam  General: Patient appears well in no acute distress.   Skin: No visualized rash or lesions on visualized skin  Eyes: EOMI, no erythema, sclera icterus or discharge noted  Resp: Appears to be breathing comfortably without accessory muscle usage, speaking in full sentences, no cough  MSK: Appears to have normal range of motion based on visualized movements  Neurologic: No apparent tremors, facial movements symmetric  Psych: " affect normal, alert and oriented    The rest of a comprehensive physical examination is deferred due to PHE (public health emergency) video restrictions      PERTINENT STUDIES Reviewed in EMR    ASSESSMENT/PLAN:  20 year old adopted female with PMH of psoriasis with psoriatic arthritis presenting to GI clinic for blood in stools.    # Irregular bowel habits  # BRBPR  # Abdominal discomfort  Several year history of intermittent blood in stool with recent worsening, accompanied by intermittent loose stools in the background of bowel pattern consistent with constipation. Work-up negative for COVID-19, enteric pathogens was negative. CBC, CMP unremarkable. Colonoscopy revealing for internal hemorrhoids. Symptoms have since resolved with increased dietary fiber and probiotic.    -- Continued adherence to high fiber diet, minimum 25-30 grams per day  -- Add in soluble fiber supplement if needed    # H pylori  Incidental finding of H pylori on gastric biopsies, which also showed chronic inflammation. She has occasional heartburn symptoms, otherwise asymptomatic. Plan to start treatment end of December following completion of current semester, per her preference. Planning quad therapy with confirmation of eradication 2-4 weeks after completion. She will reach out in December when she is ready to take the medications.      RTC 6 months    Thank you for this consultation. It was a pleasure to participate in the care of this patient; please contact us with any further questions.    Elizabeth Toney PA-C    23 minutes spent on the date of the encounter doing chart review, review of test results, patient visit and documentation        Again, thank you for allowing me to participate in the care of your patient.      Sincerely,    Elizabeth Toney PA-C

## 2021-08-24 NOTE — PROGRESS NOTES
Deedee Tyson is a 20 year old old who is being evaluated via a billable video visit.      How would you like to obtain your AVS? MyChart  If the video visit is dropped, the invitation should be resent by: Send to e-mail at: [email or web address]  Will anyone else be joining your video visit? No      [facility]      Video start 358  Video end 410  Patient home  Provider home  [employer] used  ECT

## 2021-08-24 NOTE — NURSING NOTE
"Chief Complaint   Patient presents with     Follow Up       Vitals:    08/24/21 1524   Weight: 130 lb   Height: 5' 2\"       Body mass index is 23.78 kg/m .      Lalitha Sharp, [facility]  Surgery Clinic                      "

## 2021-08-25 ENCOUNTER — TELEPHONE (OUTPATIENT)
Dept: GASTROENTEROLOGY | Facility: CLINIC | Age: 21
End: 2021-08-25

## 2021-09-25 ENCOUNTER — HEALTH MAINTENANCE LETTER (OUTPATIENT)
Age: 21
End: 2021-09-25

## 2021-09-29 ENCOUNTER — TELEPHONE (OUTPATIENT)
Dept: PEDIATRICS | Facility: OTHER | Age: 21
End: 2021-09-29
Payer: COMMERCIAL

## 2021-09-29 NOTE — TELEPHONE ENCOUNTER
Patient Quality Outreach Summary      Summary:    Patient is due/failing the following:   Cervical Cancer Screening - PAP Needed and Physical     Type of outreach:    Sent [employer] message.    Questions for provider review:    None                                                                                                                  Deysi Morgan CMA       Chart routed to Care Team.

## 2021-12-23 ENCOUNTER — MYC MEDICAL ADVICE (OUTPATIENT)
Dept: GASTROENTEROLOGY | Facility: CLINIC | Age: 21
End: 2021-12-23
Payer: COMMERCIAL

## 2021-12-23 DIAGNOSIS — A04.8 HELICOBACTER PYLORI (H. PYLORI) INFECTION: Primary | ICD-10-CM

## 2021-12-28 ENCOUNTER — VIRTUAL VISIT (OUTPATIENT)
Dept: PHARMACY | Facility: CLINIC | Age: 21
End: 2021-12-28
Payer: COMMERCIAL

## 2021-12-28 DIAGNOSIS — A04.8 H. PYLORI INFECTION: Primary | ICD-10-CM

## 2021-12-28 PROCEDURE — 99605 MTMS BY PHARM NP 15 MIN: CPT | Performed by: PHARMACIST

## 2021-12-28 RX ORDER — BISMUTH SUBSALICYLATE 262 MG/1
2 TABLET, CHEWABLE ORAL
Qty: 112 TABLET | Refills: 0 | Status: SHIPPED | OUTPATIENT
Start: 2021-12-28

## 2021-12-28 RX ORDER — DOXYCYCLINE HYCLATE 100 MG
100 TABLET ORAL 2 TIMES DAILY
Qty: 28 TABLET | Refills: 0 | Status: SHIPPED | OUTPATIENT
Start: 2021-12-28

## 2021-12-28 RX ORDER — METRONIDAZOLE 250 MG/1
250 TABLET ORAL 4 TIMES DAILY
Qty: 56 TABLET | Refills: 0 | Status: SHIPPED | OUTPATIENT
Start: 2021-12-28

## 2021-12-28 NOTE — PROGRESS NOTES
"Medication Therapy Management (MTM) Encounter    ASSESSMENT:                            Medication Adherence/Access: No issues identified    H pylori: Deedee would benefit from H pylori treatment as indicated by Elizabeth JOSE. Given she is not on any medications at this time, no concerns for drug-drug interactions with bismuth quadruple therapy. We discussed interactions with methotrexate in the event she re-starts this while on therapy, however, she prefers to hold off on re-starting this until after treatment. Reviewed recommendation for eradication testing which should be at least four weeks after treatment completion due to potential for false negatives from pepto-bismol/PPI/antibiotic exposure. Encouraged her to reach out if she has difficulty with accessing or tolerating the regimen.     PLAN:                            1. Recommend bismuth quadruple therapy x 14 days   -- omeprazole 20 mg twice daily    -- doxycycline 100 mg twice daily   -- metronidazole 250 mg four times daily   -- pepto bismol 524 mg four times daily     - Prefers [facility] for orders. Orders placed per previous discussion with Elizabeth JOSE.    Follow-up: -- 7-10 days after treatment start for check-in   -- at least four weeks after treatment completion for stool antigen testing    SUBJECTIVE/OBJECTIVE:                          Deedee Tyson is a 21 year old female called for an initial visit. She was referred to me from Elizabeth Toney PA-C.      Reason for visit: H pylori treatment    Allergies/ADRs: Reviewed in chart  Tobacco: She reports that she has never smoked. She has never used smokeless tobacco.  Alcohol: on occasion - okay with holding off during H pylori treatment    Medication Adherence/Access:   - no concerns    H pylori:    She had an EGD/colonoscopy in May of this year with incidental finding of H pylori. Saw Elizabeth in late summer but preferred to defer treatment due to school.    \"# H pylori  Incidental finding of H pylori on " "gastric biopsies, which also showed chronic inflammation. She has occasional heartburn symptoms, otherwise asymptomatic. Plan to start treatment end of December following completion of current semester, per her preference. Planning quad therapy with confirmation of eradication 2-4 weeks after completion. She will reach out in December when she is ready to take the medications.\"    We reviewed H pylori quadruple therapy today including medications, mechanisms of action, dosing and general counseling information. Discussed potential for pepto bismol to darken stools/tongue. It is also okay to get this over-the-counter if not covered by insurance. We reviewed avoidance of alcohol during treatment and for a few days after treatment. We discussed various rashes that may occur from antibiotic therapy and how to manage each of these. No concerns for pregnancy at this time and she confirms she is not breastfeeding.    ANAY:   Methotrexate 15 mg weekly (not taking)  Folic acid (not taking)  meloxicam 7.5 mg tablets (1.5 tablets daily with food - not taking)    She notes that she is not on any medications at this time. She is hoping to re-start the methotrexate/meloxicam, but needs to discuss this with her ordering provider over break. We reviewed potential interactions with quadruple therapy and she will plan to not re-start until after treatment.     ----------------    I spent 13 minutes with this patient today. All changes were made via verbal approval with Elizabeth Toney PA-C. A copy of the visit note was provided to the patient's referring provider.    The patient was sent via [employer] a summary of these recommendations.     Indy PatiñoD, BCACP  MTM Pharmacist   [facility] Gastroenterology and Rheumatology  Phone: [phone]    Telemedicine Visit Details  Type of service:  Telephone visit  Start Time: 1:00 PM  End Time: 1:13 PM  Originating Location (patient location): Home  Distant Location (provider " location):  [facility]     Medication Therapy Recommendations  H. pylori infection    Rationale: Untreated condition - Needs additional medication therapy - Indication   Recommendation: Start Medication   Status: Accepted per Provider   Note: Recommend bismuth quadruple therapy x 14 days.

## 2021-12-28 NOTE — PATIENT INSTRUCTIONS
Recommendations from today's MTM visit:                                                    MTM (medication therapy management) is a service provided by a clinical pharmacist designed to help you get the most of out of your medicines.   Today we reviewed what your medicines are for, how to know if they are working, that your medicines are safe and how to make your medicine regimen as easy as possible.      1. Recommend bismuth quadruple therapy x 14 days   -- omeprazole 20 mg by mouth twice daily    -- doxycycline 100 mg by mouth twice daily   -- metronidazole 250 mg by mouth four times daily   -- pepto bismol 524 mg by mouth four times daily     Follow-up: -- 7-10 days after treatment start for check-in   -- at least four weeks after treatment completion for stool antigen testing    It was great to speak with you today.  I value your experience and would be very thankful for your time with providing feedback on our clinic survey. You may receive a survey via email or text message in the next few days.     To schedule another MTM appointment, please call the clinic directly or you may call the MTM scheduling line at [phone] or toll-free at [phone].     My Clinical Pharmacist's contact information:                                                      Please feel free to contact me with any questions or concerns you have.      Indy Alaniz PharmD, BCACP  MTM Pharmacist   [facility] Gastroenterology and Rheumatology  Phone: [phone]

## 2022-01-10 ENCOUNTER — TELEPHONE (OUTPATIENT)
Dept: PHARMACY | Facility: CLINIC | Age: 22
End: 2022-01-10
Payer: COMMERCIAL

## 2022-01-10 NOTE — TELEPHONE ENCOUNTER
Deedee left a voicemail that she has not been feeling well today and threw up x 3. She has been taking medication for H pylori treatment and asked for a call back. Returned call - left message for her to try me back to discuss.

## 2022-01-10 NOTE — TELEPHONE ENCOUNTER
Deedee states that this kind of started yesterday when she got a really bad headache. Her head still hurts today and she did end up vomiting x3. She is not sure she can keep her medications down today.    She started H pylori treatment 12/29 and was able to complete 12 days of treatment. We discussed risk/benefit of ending therapy today versus trying to resume tomorrow. She will think about this, but is leaning towards terming it at 12 days. We agree that this is less likely due to intolerance of the medications given she has been able to tolerate it thus far.    She denies being around anyone who tested positive for COVID-19 recently and states that she herself was positive about a month ago.    She is wondering if she can take acetaminophen/ibuprofen. She is not yet on methotrexate/meloxicam - has not re-started. Acetaminophen okay. Reviewed concerns with ibuprofen, especially if she is dehydrated.

## 2022-01-15 ENCOUNTER — HEALTH MAINTENANCE LETTER (OUTPATIENT)
Age: 22
End: 2022-01-15

## 2022-03-15 ENCOUNTER — LAB (OUTPATIENT)
Dept: LAB | Facility: OTHER | Age: 22
End: 2022-03-15
Payer: COMMERCIAL

## 2022-03-15 DIAGNOSIS — A04.8 H. PYLORI INFECTION: ICD-10-CM

## 2022-03-15 PROCEDURE — 87338 HPYLORI STOOL AG IA: CPT

## 2022-03-16 LAB — H PYLORI AG STL QL IA: NEGATIVE

## 2023-01-07 ENCOUNTER — HEALTH MAINTENANCE LETTER (OUTPATIENT)
Age: 23
End: 2023-01-07

## 2023-04-22 ENCOUNTER — HEALTH MAINTENANCE LETTER (OUTPATIENT)
Age: 23
End: 2023-04-22

## 2024-03-28 NOTE — TELEPHONE ENCOUNTER
Patient notified via voicemail and was advised to give us a call back regarding this information on 3/28 @ 4:06PM.   LVM patient is overdue for a follow up.

## 2024-06-29 ENCOUNTER — HEALTH MAINTENANCE LETTER (OUTPATIENT)
Age: 24
End: 2024-06-29

## (undated) DEVICE — ENDO FORCEP BX CAPTURA PRO SPIKE G50696

## (undated) DEVICE — SOL NACL 0.9% IRRIG 1000ML BOTTLE 07138-09

## (undated) DEVICE — SOL NACL 0.9% INJ 1000ML BAG 07983-09

## (undated) DEVICE — KIT ENDO TURNOVER/PROCEDURE CARRY-ON 101822

## (undated) DEVICE — SPONGE RAY-TEC 4X8" 7318

## (undated) DEVICE — GLOVE EXAM NITRILE LG PF LATEX FREE 5064

## (undated) DEVICE — TUBING SUCTION 12"X1/4" N612

## (undated) DEVICE — SPECIMEN CONTAINER 3OZ W/FORMALIN 59901

## (undated) DEVICE — ESU COBLATOR II WAND 70DEG XTRA ULTRA EIC5872-01

## (undated) DEVICE — GLOVE PROTEXIS W/NEU-THERA 8.0  2D73TE80

## (undated) DEVICE — ENDO TRAP POLYP E-TRAP 00711099

## (undated) DEVICE — SYR 30ML SLIP TIP W/O NDL 302833

## (undated) DEVICE — ENDO BITE BLOCK ADULT OMNI-BLOC

## (undated) DEVICE — SUCTION CATH AIRLIFE TRI-FLO W/CONTROL PORT 14FR  T60C

## (undated) DEVICE — GOWN IMPERVIOUS 2XL BLUE

## (undated) DEVICE — PACK T & A CUSTOM

## (undated) DEVICE — SOL WATER IRRIG 500ML BOTTLE 2F7113

## (undated) DEVICE — SUCTION MANIFOLD NEPTUNE 2 SYS 1 PORT 702-025-000

## (undated) RX ORDER — LIDOCAINE HYDROCHLORIDE 20 MG/ML
INJECTION, SOLUTION EPIDURAL; INFILTRATION; INTRACAUDAL; PERINEURAL
Status: DISPENSED
Start: 2017-12-26

## (undated) RX ORDER — PROPOFOL 10 MG/ML
INJECTION, EMULSION INTRAVENOUS
Status: DISPENSED
Start: 2017-12-26

## (undated) RX ORDER — DEXAMETHASONE SODIUM PHOSPHATE 10 MG/ML
INJECTION INTRAMUSCULAR; INTRAVENOUS
Status: DISPENSED
Start: 2017-12-26

## (undated) RX ORDER — FENTANYL CITRATE 50 UG/ML
INJECTION, SOLUTION INTRAMUSCULAR; INTRAVENOUS
Status: DISPENSED
Start: 2017-12-26

## (undated) RX ORDER — ONDANSETRON 2 MG/ML
INJECTION INTRAMUSCULAR; INTRAVENOUS
Status: DISPENSED
Start: 2017-12-26